# Patient Record
Sex: FEMALE | Race: WHITE | NOT HISPANIC OR LATINO | Employment: UNEMPLOYED | URBAN - METROPOLITAN AREA
[De-identification: names, ages, dates, MRNs, and addresses within clinical notes are randomized per-mention and may not be internally consistent; named-entity substitution may affect disease eponyms.]

---

## 2017-09-13 ENCOUNTER — TRANSFERRED RECORDS (OUTPATIENT)
Dept: HEALTH INFORMATION MANAGEMENT | Facility: CLINIC | Age: 37
End: 2017-09-13

## 2017-10-30 ENCOUNTER — HOSPITAL ENCOUNTER (INPATIENT)
Facility: CLINIC | Age: 37
LOS: 3 days | Discharge: HOME | End: 2017-11-02
Attending: INTERNAL MEDICINE | Admitting: INTERNAL MEDICINE
Payer: COMMERCIAL

## 2017-10-30 PROBLEM — R56.9 SEIZURE (H): Status: ACTIVE | Noted: 2017-10-30

## 2017-10-30 LAB
ALBUMIN SERPL-MCNC: 3.3 G/DL (ref 3.4–5)
ALP SERPL-CCNC: 50 U/L (ref 40–150)
ALT SERPL W P-5'-P-CCNC: 52 U/L (ref 0–50)
ANION GAP SERPL CALCULATED.3IONS-SCNC: 9 MMOL/L (ref 3–14)
AST SERPL W P-5'-P-CCNC: 50 U/L (ref 0–45)
BILIRUB SERPL-MCNC: 0.6 MG/DL (ref 0.2–1.3)
BUN SERPL-MCNC: 8 MG/DL (ref 7–30)
CALCIUM SERPL-MCNC: 8.1 MG/DL (ref 8.5–10.1)
CHLORIDE SERPL-SCNC: 108 MMOL/L (ref 94–109)
CO2 SERPL-SCNC: 24 MMOL/L (ref 20–32)
CREAT SERPL-MCNC: 0.53 MG/DL (ref 0.52–1.04)
ERYTHROCYTE [DISTWIDTH] IN BLOOD BY AUTOMATED COUNT: 12.6 % (ref 10–15)
GFR SERPL CREATININE-BSD FRML MDRD: >90 ML/MIN/1.7M2
GLUCOSE SERPL-MCNC: 105 MG/DL (ref 70–99)
HCG UR QL: NEGATIVE
HCT VFR BLD AUTO: 38 % (ref 35–47)
HGB BLD-MCNC: 13 G/DL (ref 11.7–15.7)
MCH RBC QN AUTO: 29.1 PG (ref 26.5–33)
MCHC RBC AUTO-ENTMCNC: 34.2 G/DL (ref 31.5–36.5)
MCV RBC AUTO: 85 FL (ref 78–100)
PLATELET # BLD AUTO: 188 10E9/L (ref 150–450)
POTASSIUM SERPL-SCNC: 3.7 MMOL/L (ref 3.4–5.3)
PROT SERPL-MCNC: 6.6 G/DL (ref 6.8–8.8)
RBC # BLD AUTO: 4.47 10E12/L (ref 3.8–5.2)
SODIUM SERPL-SCNC: 141 MMOL/L (ref 133–144)
WBC # BLD AUTO: 6.3 10E9/L (ref 4–11)

## 2017-10-30 PROCEDURE — 95951 ZZHC EEG VIDEO EACH 24 HR: CPT

## 2017-10-30 PROCEDURE — 40000061 ZZH STATISTIC EEG TIME EA 10 MIN

## 2017-10-30 PROCEDURE — 36415 COLL VENOUS BLD VENIPUNCTURE: CPT | Performed by: PHYSICIAN ASSISTANT

## 2017-10-30 PROCEDURE — 80053 COMPREHEN METABOLIC PANEL: CPT | Performed by: PHYSICIAN ASSISTANT

## 2017-10-30 PROCEDURE — 85027 COMPLETE CBC AUTOMATED: CPT | Performed by: PHYSICIAN ASSISTANT

## 2017-10-30 PROCEDURE — 25000132 ZZH RX MED GY IP 250 OP 250 PS 637: Performed by: PHYSICIAN ASSISTANT

## 2017-10-30 PROCEDURE — 25000132 ZZH RX MED GY IP 250 OP 250 PS 637: Performed by: PSYCHIATRY & NEUROLOGY

## 2017-10-30 PROCEDURE — 12000000 ZZH R&B MED SURG/OB

## 2017-10-30 PROCEDURE — 81025 URINE PREGNANCY TEST: CPT | Performed by: PHYSICIAN ASSISTANT

## 2017-10-30 PROCEDURE — 99207 ZZC APP CREDIT; MD BILLING SHARED VISIT: CPT | Performed by: PHYSICIAN ASSISTANT

## 2017-10-30 PROCEDURE — 99223 1ST HOSP IP/OBS HIGH 75: CPT | Mod: AI | Performed by: INTERNAL MEDICINE

## 2017-10-30 RX ORDER — RIZATRIPTAN BENZOATE 5 MG/1
5 TABLET, ORALLY DISINTEGRATING ORAL
COMMUNITY
End: 2018-05-18 | Stop reason: DRUGHIGH

## 2017-10-30 RX ORDER — ACETAMINOPHEN 325 MG/1
650 TABLET ORAL EVERY 4 HOURS PRN
Status: DISCONTINUED | OUTPATIENT
Start: 2017-10-30 | End: 2017-11-02 | Stop reason: HOSPADM

## 2017-10-30 RX ORDER — SERTRALINE HYDROCHLORIDE 100 MG/1
100 TABLET, FILM COATED ORAL DAILY
Status: DISCONTINUED | OUTPATIENT
Start: 2017-10-30 | End: 2017-11-02 | Stop reason: HOSPADM

## 2017-10-30 RX ORDER — LORAZEPAM 2 MG/ML
2 INJECTION INTRAMUSCULAR EVERY 5 MIN PRN
Status: DISCONTINUED | OUTPATIENT
Start: 2017-10-30 | End: 2017-11-02 | Stop reason: HOSPADM

## 2017-10-30 RX ORDER — LIDOCAINE 40 MG/G
CREAM TOPICAL
Status: DISCONTINUED | OUTPATIENT
Start: 2017-10-30 | End: 2017-11-02 | Stop reason: HOSPADM

## 2017-10-30 RX ORDER — LEVETIRACETAM 750 MG/1
750 TABLET ORAL 2 TIMES DAILY
Status: DISCONTINUED | OUTPATIENT
Start: 2017-10-30 | End: 2017-10-31

## 2017-10-30 RX ORDER — GINSENG 100 MG
CAPSULE ORAL 3 TIMES DAILY PRN
Status: DISCONTINUED | OUTPATIENT
Start: 2017-10-30 | End: 2017-11-02 | Stop reason: HOSPADM

## 2017-10-30 RX ADMIN — ACETAMINOPHEN 650 MG: 325 TABLET, FILM COATED ORAL at 15:07

## 2017-10-30 RX ADMIN — SERTRALINE HYDROCHLORIDE 100 MG: 100 TABLET ORAL at 11:15

## 2017-10-30 RX ADMIN — LEVETIRACETAM 750 MG: 750 TABLET, FILM COATED ORAL at 11:15

## 2017-10-30 RX ADMIN — LEVETIRACETAM 750 MG: 750 TABLET, FILM COATED ORAL at 20:19

## 2017-10-30 ASSESSMENT — ACTIVITIES OF DAILY LIVING (ADL)
COGNITION: 0 - NO COGNITION ISSUES REPORTED
FALL_HISTORY_WITHIN_LAST_SIX_MONTHS: YES
ADLS_ACUITY_SCORE: 15
NUMBER_OF_TIMES_PATIENT_HAS_FALLEN_WITHIN_LAST_SIX_MONTHS: 2
TOILETING: 0-->INDEPENDENT
ADLS_ACUITY_SCORE: 9
RETIRED_COMMUNICATION: 0-->UNDERSTANDS/COMMUNICATES WITHOUT DIFFICULTY
BATHING: 0-->INDEPENDENT
ADLS_ACUITY_SCORE: 9
RETIRED_EATING: 0-->INDEPENDENT
AMBULATION: 0-->INDEPENDENT
DRESS: 0-->INDEPENDENT
TRANSFERRING: 0-->INDEPENDENT
SWALLOWING: 0-->SWALLOWS FOODS/LIQUIDS WITHOUT DIFFICULTY

## 2017-10-30 NOTE — H&P
"[facility]    History and Physical  Hospitalist       Date of Admission:  10/30/2017  Date of Service (when I saw the patient): 10/30/17    Assessment & Plan   Estelita Patricia is a 37 year old female with PMHx of seizure disorder, Crohn's disease, depression, fatty liver, and WPW who presents for continuous EEG monitoring in the setting of seizure disorder with ongoing, worsening symptoms of daily intermittent, non-focal upper and lower extremity \"twitching\" and \"blank stare.\" Vitals currently WNL. Pt currently stable.     Seizure disorder: Symptoms of upper and lower extremity twitching including her head. Non-specific, non-focal. Occurs every day, numerous times per day. No associated weakness or ataxia. No blurred vision, dysphagia. Does endorse occasional \"blank stare\" when reading or texting with associated lack of coordination at that time. Initially diagnosed with seizure disorder in 2015 at which time she was hospitalized at [facility]; had grand mal seizure. Last seizure 1 year ago. Maintained on Keppra. Also taking medical marijuana.  -- Continuous video EEG monitoring per Dr. Terrell, order set in place as well as formal Neurology consult  -- Hold PTA Keppra    Depression: Continue PTA Zoloft once verified by pharmacy. Pt with plans to establish care with Psych through [facility].     Crohn's disease: Followed by GI through [facility] and transitioning to care under [facility]. No recent flares. Not maintained on any medications.   -- Defer further management to outpatient GI     Asthma: Stable.    Fatty liver disease: Slight elevation of transaminases.     DVT Prophylaxis: Ambulate every shift  Code Status: Full Code    Disposition: Expected discharge per Neurology     Bailey Abdul PA-C    This patient was discussed with Dr. Melgoza of the Hospitalist Service who agrees with current plans as outlined above.     Primary Care Physician   Physician No Ref-Primary    Chief " "Complaint   Continuous EEG monitoring     History is obtained from the patient, CareEverywhere reviewed.     History of Present Illness   Estelita Patricia is a 37 year old female with PMHx of seizure disorder, Crohn's disease, depression, fatty liver, and WPW who presents for continuous EEG monitoring in the setting of seizure disorder with ongoing, worsening symptoms of daily intermittent, non-focal upper and lower extremity \"twitching\" and \"blank stare.\" Vitals currently WNL. Pt currently stable.     Pt notes ongoing symptoms of upper and lower extremity twitching including her head. Non-specific, non-focal. Occurs every day, numerous times per day. No associated weakness or ataxia. No blurred vision, dysphagia. Does endorse occasional \"blank stare\" when reading or texting with associated lack of coordination at that time. Initially diagnosed with seizure disorder in  at which time she was hospitalized at [facility]; had grand mal seizure. Last seizure 1 year ago. Maintained on Keppra. Also taking medical marijuana. Last used 2 days ago. Denies headache. LMP ended 3 days ago.     Past Medical History    1. Seizure disorder; last grand mal >1 year ago   2. Crohn's disease, not currently taking any medications   3. Depression, not currently followed by Psychiatrist or therapist   4. Fatty liver   5. WPW   6. Asthma  7. Morbid obesity     Past Surgical History   1. Ankle surgery X2  2. Liver bx   3. Colonoscopy, numerous   4. Tonsillectomy   5. Cholecystectomy   6,  X4     Prior to Admission Medications   None     Allergies   Allergies not on file    Social History   Denies tobacco or alcohol use. States she uses medical cannabis; last use 2 days ago.     Family History   Mother: Epilepsy    Review of Systems   The 10 point Review of Systems is negative other than noted in the HPI.    Physical Exam   Temp: 98.7  F (37.1  C) Temp src: Oral BP: 110/68 Pulse: 77   Resp: 18 SpO2: 96 % O2 Device: None " (Room air)    Vital Signs with Ranges  Temp:  [98.7  F (37.1  C)] 98.7  F (37.1  C)  Pulse:  [77] 77  Resp:  [18] 18  BP: (110)/(68) 110/68  SpO2:  [96 %] 96 %  325 lbs 9.91 oz    CONSTITUTIONAL: Obese pt laying in bed, dressed in hospital garb. Appears comfortable. Cooperative with interview.   HEENT: Normocephalic, atraumatic. Negative for conjunctival redness or scleral icterus.  Oral mucosa pink and moist; negative for ulcerations, erythema, or exudates.    CARDIOVASCULAR: RRR, no murmurs, rubs, or extra heart sounds appreciated. Pulses +2/4 and regular in upper and lower extremities, bilaterally.   RESPIRATORY: No increased work of breathing.  CTA, bilat; no wheezes, rales, or rhonchi appreciated.  GASTROINTESTINAL:  Abdomen soft, non-distended. BS auscultated in all four quadrants. Negative for tenderness to palpation.  No masses or organomegaly noted.  MUSCULOSKELETAL: No gross deformities noted. Normal muscle tone.   HEMATOLOGIC/LYMPHATIC/IMMUNOLOGIC: Negative for lower extremity edema, bilaterally.  NEUROLOGIC: Alert and oriented to person, place, and time.  strength intact. No focal neuro deficits appreciated.   SKIN: Warm, dry, intact. No jaundice noted. Negative for suspicious lesions, rashes, bruising, open sores or abrasions.     Data   Data reviewed today:  I personally reviewed no images or EKG's today.  No lab results found in last 7 days.    No results found for this or any previous visit (from the past 24 hour(s)).

## 2017-10-30 NOTE — PROGRESS NOTES
Day 1 LTV [id number]  Started --Dr Pamela Herbert ordering.  Photic activation performed , no hv--exercised induced asthma.

## 2017-10-30 NOTE — PLAN OF CARE
Problem: Seizure Disorder/Epilepsy (Adult)  Goal: Signs and Symptoms of Listed Potential Problems Will be Absent, Minimized or Managed (Seizure Disorder/Epilepsy)  Signs and symptoms of listed potential problems will be absent, minimized or managed by discharge/transition of care (reference Seizure Disorder/Epilepsy (Adult) CPG).   Outcome: No Change  Pt direct admission for 24 hr EEG video monitoring. Alert and oriented x4. VSS. Tele SBD. Sz precautions. Pt reports having intermittent leg and arm twitching, unwitnessed by RN. PRN tylenol administered for HA. CMS intact. Bedrest with bathroom privileges. Tolerating regular diet.     Pt continues to report frequent twitching of arms, legs and shoulders. Pressed event monitor button on EEG x1 to report shoulders twitching. No active twitching movements observed by RN.

## 2017-10-30 NOTE — CONSULTS
[facility]    Neurology Consultation     Date of Admission:  10/30/2017  Date of Consult (When I saw the patient): 10/30/17    Assessment & Plan   Estelita Patricia is a 37 year old female who was admitted on 10/30/2017. I was asked to see the patient for myoclonic jerks and staring events- eval for seizures.      #. Seizures- Epilepsy monitoring  --Admit for continuous video/EEG monitoring  --BID hv, photic and exercise  --basic labs: WBC normal  --sleep deprivation: 4 hours of sleep a night  --seizure precautions as per protocol  --home AEDs:  Stop CBD in hospital.  Decrease Keppra to 750 mg bid today.  PTA meds are Keppra 1500 mg bid and CBD.  --Tele monitoring:started  --emergency AED treatment as per usual  --out of bed or chair only with assistance  --continuous pulse ox    #. DVT Prophylaxis  --SCDs  #. Depression  -- continue PTA sertraline  #. Intermittent migraine  --Continue pta prn rizatriptan      I discussed the above diagnosis, assessment, and further testing with the patient.    Indira Terrell MD    Code Status    Full Code        Reason for Consult   Reason for consult: I was asked by Bailey SY to evaluate this patient for frequent seizures.      Chief Complaint   Frequent seizures and myoclonus    History is obtained from the patient and the electronic medical chart.    History of Present Illness   Estelita Patricia is a 37 year old female who presents with a long history of a generalized seizure disorder.  She continues to have myoclonic jerks despite well controlled gtcs.  She is very interested in surgery (VNS) for treatment of her myoclonic jerks.  She has a few per hour in a typical day.  She will drop things, and arms will move randomly at times.  She also has staring spells- up to a few per day- and she has no post-ictal type period with these.  She previously was monitored at [facility]- with JUAN events found.     Her last GTC was 2014 with LOC and  sometimes injuries with the GTCs- 2 total that she knows of.  She also has depression treated by psych.       Current AED: Keppra 150 mg bid and medical CBD.  Previous AED: Depakote caused a tremor    She also notes some numbness in the left arm and left upper outer thigh recently.    Past Medical History   I have reviewed this patient's medical history and updated it with pertinent information if needed.   Epilepsy  Asthma  Lupus  Renal stones      Past Surgical History   I have reviewed this patient's surgical history and updated it with pertinent information if needed.  Ankle repair  C section  Cholecystectomy    Prior to Admission Medications   Prior to Admission Medications   Prescriptions Last Dose Informant Patient Reported? Taking?   LevETIRAcetam (KEPPRA PO) 10/29/2017 at hs  Yes Yes   Sig: Take 1,500 mg by mouth 2 times daily   Sertraline HCl (ZOLOFT PO) 10/29/2017 at am  Yes Yes   Sig: Take 100 mg by mouth   rizatriptan (MAXALT-MLT) 5 MG ODT tab   Yes Yes   Sig: Take 5 mg by mouth at onset of headache for migraine MR x 1      Facility-Administered Medications: None     Allergies   Morphine, sulfa    Social History   I have reviewed this patient's social history and updated it with pertinent information if needed. Estelita Patricia  drinks alcohol some days.  No tobacco.    Family History   I have reviewed this patient's family history and updated it with pertinent information if needed.   Seizures in mother.  Mother was adopted, father's medical history unknown.    Review of Systems   The 10 point Review of Systems is negative other than noted in the HPI.    Physical Exam   Temp: 98.7  F (37.1  C) Temp src: Oral BP: 110/68 Pulse: 77   Resp: 18 SpO2: 96 % O2 Device: None (Room air)    Vital Signs with Ranges  Temp:  [98.7  F (37.1  C)] 98.7  F (37.1  C)  Pulse:  [77] 77  Resp:  [18] 18  BP: (110)/(68) 110/68  SpO2:  [96 %] 96 %  325 lbs 9.91 oz    General Appearance:  No acute distress  Neuro:       Mental  Status Exam:   A,A, fully oriented       Cranial Nerves:  CN 2-12 examined and intact       Motor:  5/5 BUE and BLE       Reflexes:  2+ B Bi, Br, Patellar, Achilles, mute toes, no clonus       Sensory:  Nl LT x4       Coordination:   fnf normal bilaterally       Gait:  Normal with normal pivot on turn  Neck: no nuchal rigidity. No carotid bruits.    Extremities: No clubbing, no cyanosis, no edema  CV: RRR nl s1, s2  Resp: CTAB        Data   Results for orders placed or performed during the hospital encounter of 10/30/17 (from the past 24 hour(s))   CBC with platelets   Result Value Ref Range    WBC 6.3 4.0 - 11.0 10e9/L    RBC Count 4.47 3.8 - 5.2 10e12/L    Hemoglobin 13.0 11.7 - 15.7 g/dL    Hematocrit 38.0 35.0 - 47.0 %    MCV 85 78 - 100 fl    MCH 29.1 26.5 - 33.0 pg    MCHC 34.2 31.5 - 36.5 g/dL    RDW 12.6 10.0 - 15.0 %    Platelet Count 188 150 - 450 10e9/L           Imaging and procedures:  I personally reviewed these images.  None yet

## 2017-10-30 NOTE — IP AVS SNAPSHOT
MRN:[id number]                      After Visit Summary   10/30/2017    Estelita Patricia    MRN: [id number]           Thank you!     Thank you for choosing [facility] for your care. Our goal is always to provide you with excellent care. Hearing back from our patients is one way we can continue to improve our services. Please take a few minutes to complete the written survey that you may receive in the mail after you visit with us. Thank you!        Patient Information     Date Of Birth          1980        Designated Caregiver       Most Recent Value    Caregiver    Will someone help with your care after discharge? yes    Name of designated caregiver Scout    Phone number of caregiver [phone]    Caregiver address unknown      About your hospital stay     You were admitted on:  October 30, 2017 You last received care in the:  [address] [facility]    You were discharged on:  November 2, 2017        Reason for your hospital stay       You have been hospitalized for EEG monitoring                  Who to Call     For medical emergencies, please call 911.  For non-urgent questions about your medical care, please call your primary care provider or clinic, None          Attending Provider     Provider Specialty    Yoshi Ruiz MD Internal Medicine    Howard Melgoza MD Internal Medicine       Primary Care Provider    Physician No Ref-Primary      After Care Instructions     Activity       Your activity upon discharge: activity as tolerated and no driving for today            Diet       Follow this diet upon discharge: Orders Placed This Encounter      Regular Diet Adult                  Follow-up Appointments     Follow-up and recommended labs and tests        Follow up with Dr. Terrell                  Further instructions from your care team       Follow-up with Dr. Anthony Nunez in 1-2 months.  Address: [address], [place] [address]   Phone: [phone]  [phone]         Pending Results     No orders found from 10/28/2017 to 10/31/2017.            Statement of Approval     Ordered          17 1356  I have reviewed and agree with all the recommendations and orders detailed in this document.  EFFECTIVE NOW     Approved and electronically signed by:  Sam Brush MD             Admission Information     Date & Time Provider Department Dept. Phone    10/30/2017 Howard Melgoza MD [address] [facility] [phone]      Your Vitals Were     Blood Pressure Pulse Temperature Respirations Weight Pulse Oximetry    107/63 78 98  F (36.7  C) (Oral) 16 147.7 kg (325 lb 9.9 oz) 95%      MyChart Information     [employer] lets you send messages to your doctor, view your test results, renew your prescriptions, schedule appointments and more. To sign up, go to [email or web address] . Click on \"Log in\" on the left side of the screen, which will take you to the Welcome page. Then click on \"Sign up Now\" on the right side of the page.     You will be asked to enter the access code listed below, as well as some personal information. Please follow the directions to create your username and password.     Your access code is: [email or web address]  Expires: 2018  1:58 PM     Your access code will  in 90 days. If you need help or a new code, please call your [facility] clinic or [phone].        Care EveryWhere ID     This is your Care EveryWhere ID. This could be used by other organizations to access your [facility] medical records  [id number]        Equal Access to Services     [facility]: Hadii aad mathew hadasho Somiguelali, waaxda luqadaha, qaybta kaalmada glen, mike baker . So [facility] [phone].    ATENCIÓN: Si habla español, tiene a mckeon disposición servicios gratuitos de asistencia lingüística. Llame al [phone].    We comply with applicable federal civil rights laws and [place] laws. We do not discriminate on the " basis of race, color, national origin, age, disability, sex, sexual orientation, or gender identity.               Review of your medicines      CONTINUE these medicines which have NOT CHANGED        Dose / Directions    ACETAMINOPHEN PO        Dose:  1000 mg   Take 1,000 mg by mouth At Bedtime   Refills:  0       AMBIEN PO        Dose:  10 mg   Take 10 mg by mouth nightly as needed for sleep   Refills:  0       KEPPRA PO        Dose:  1500 mg   Take 1,500 mg by mouth 2 times daily   Refills:  0       rizatriptan 5 MG ODT tab   Commonly known as:  MAXALT-MLT        Dose:  5 mg   Take 5 mg by mouth at onset of headache for migraine MR x 1   Refills:  0       ZOLOFT PO        Dose:  100 mg   Take 100 mg by mouth   Refills:  0         STOP taking     NONFORMULARY                    Protect others around you: Learn how to safely use, store and throw away your medicines at www.disposemymeds.org.             Medication List: This is a list of all your medications and when to take them. Check marks below indicate your daily home schedule. Keep this list as a reference.      Medications           Morning Afternoon Evening Bedtime As Needed    ACETAMINOPHEN PO   Take 1,000 mg by mouth At Bedtime   Last time this was given:  650 mg on 11/2/2017 10:48 AM                                   AMBIEN PO   Take 10 mg by mouth nightly as needed for sleep                                   KEPPRA PO   Take 1,500 mg by mouth 2 times daily   Last time this was given:  2,000 mg on 11/2/2017  9:05 AM   Next Dose Due:  Take 1,500mg by mouth at 9:00pm             9:00AM                 9:00PM           rizatriptan 5 MG ODT tab   Commonly known as:  MAXALT-MLT   Take 5 mg by mouth at onset of headache for migraine MR x 1                                   ZOLOFT PO   Take 100 mg by mouth   Last time this was given:  100 mg on 11/2/2017  9:06 AM            9:00 AM

## 2017-10-30 NOTE — PHARMACY-ADMISSION MEDICATION HISTORY
"Admission medication history interview status for the 10/30/2017  admission is complete. See EPIC admission navigator for prior to admission medications     Medication history source reliability:Good    Actions taken by pharmacist (provider contacted, etc):None     Additional medication history information not noted on PTA med list : Pt smokes canabis and considers it \"Medicinal\" for seizures    Medication reconciliation/reorder completed by provider prior to medication history? No    Time spent in this activity: 25 min    Prior to Admission medications    Medication Sig Last Dose Taking? Auth Provider   Zolpidem Tartrate (AMBIEN PO) Take 10 mg by mouth nightly as needed for sleep 10/29/2017 at hs Yes Unknown, Entered By History   ACETAMINOPHEN PO Take 1,000 mg by mouth At Bedtime 10/29/2017 at hs Yes Unknown, Entered By History   NONFORMULARY Canabis 2 puffs q2h prn seizure Past Week at Unknown time Yes Unknown, Entered By History   LevETIRAcetam (KEPPRA PO) Take 1,500 mg by mouth 2 times daily 10/29/2017 at hs Yes Unknown, Entered By History   Sertraline HCl (ZOLOFT PO) Take 100 mg by mouth 10/29/2017 at am Yes Unknown, Entered By History   rizatriptan (MAXALT-MLT) 5 MG ODT tab Take 5 mg by mouth at onset of headache for migraine MR x 1 prn Yes Unknown, Entered By History         "

## 2017-10-30 NOTE — IP AVS SNAPSHOT
[address] [facility]    [address]    [place] [address]    Phone:  [phone]                                       After Visit Summary   10/30/2017    Estelita Patricia    MRN: [id number]           After Visit Summary Signature Page     I have received my discharge instructions, and my questions have been answered. I have discussed any challenges I see with this plan with the nurse or doctor.    ..........................................................................................................................................  Patient/Patient Representative Signature      ..........................................................................................................................................  Patient Representative Print Name and Relationship to Patient    ..................................................               ................................................  Date                                            Time    ..........................................................................................................................................  Reviewed by Signature/Title    ...................................................              ..............................................  Date                                                            Time

## 2017-10-31 PROCEDURE — 25000132 ZZH RX MED GY IP 250 OP 250 PS 637: Performed by: PSYCHIATRY & NEUROLOGY

## 2017-10-31 PROCEDURE — 99232 SBSQ HOSP IP/OBS MODERATE 35: CPT | Performed by: INTERNAL MEDICINE

## 2017-10-31 PROCEDURE — 12000007 ZZH R&B INTERMEDIATE

## 2017-10-31 PROCEDURE — 95951 ZZHC EEG VIDEO EACH 24 HR: CPT

## 2017-10-31 PROCEDURE — 25000132 ZZH RX MED GY IP 250 OP 250 PS 637: Performed by: PHYSICIAN ASSISTANT

## 2017-10-31 RX ADMIN — LEVETIRACETAM 750 MG: 750 TABLET, FILM COATED ORAL at 09:06

## 2017-10-31 RX ADMIN — SERTRALINE HYDROCHLORIDE 100 MG: 100 TABLET ORAL at 09:06

## 2017-10-31 RX ADMIN — ACETAMINOPHEN 650 MG: 325 TABLET, FILM COATED ORAL at 04:48

## 2017-10-31 RX ADMIN — ACETAMINOPHEN 650 MG: 325 TABLET, FILM COATED ORAL at 00:03

## 2017-10-31 ASSESSMENT — ACTIVITIES OF DAILY LIVING (ADL)
ADLS_ACUITY_SCORE: 9

## 2017-10-31 NOTE — PROGRESS NOTES
[facility]    Neurology Progress Note    Date of Service (when I saw the patient): 10/31/2017     Today's developments:  24 hours of video EEG done.  No abnormal discharges on EEG.  About 5 jerking movements typical of what she experiences at home- no EEG change during these.  Will stop her Keppra, continue monitoring.    Assessment & Plan   Estelita Patricia is a 37 year old female who was admitted on 10/30/2017. I was asked to see the patient for myoclonic jerks and staring events- eval for seizures.        #. Seizures- Epilepsy monitoring  --Continuous video/EEG monitoring  --BID hv, photic and exercise  --basic labs: AST/ALT very mildly elevated, mild low albumin and protein, hcg negative, WBC normal  --sleep deprivation: 4 hours of sleep tonight.  Then starting 8 am on 11-1-17 she can sleep whenever she wants.  --seizure precautions as per protocol  --home AEDs:  Stop CBD in hospital.  Stop Keppra today.  PTA meds are Keppra 1500 mg bid and CBD.  --Tele monitoring: NSR and mild Sinus gabriella  --emergency AED treatment as per usual  --out of bed or chair only with assistance  --continuous pulse ox     #. DVT Prophylaxis  --SCDs  #. Depression  -- continue PTA sertraline  #. Intermittent migraine  --Continue pta prn rizatriptan     I discussed the above diagnosis, assessment, testing, and results with the patient.    Indira Terrell MD          Interval History   Estelita had over 5 of her typical jerking movements.  This included her arms and shoulders.  No staring spells or other types of seizures that she knows of.  She feels very tired today from the sleep deprivation.  No other complaints.    Physical Exam   Temp: 97.8  F (36.6  C) Temp src: Axillary BP: 118/61 Pulse: 67   Resp: 16 SpO2: 96 % O2 Device: None (Room air)    Vitals:    10/30/17 0851   Weight: (!) 147.7 kg (325 lb 9.9 oz)     Vital Signs with Ranges  Temp:  [97.7  F (36.5  C)-98.5  F (36.9  C)] 97.8  F (36.6  C)  Pulse:   [64-84] 67  Resp:  [16-18] 16  BP: (103-118)/(54-68) 118/61  SpO2:  [96 %-99 %] 96 %         General Appearance:  No acute distress  Neuro:       Mental Status Exam: A,A fully oriented       Cranial Nerves:   EOMI, face equal and symmetric       Motor:   5/5 BUE and BLE       Sensory:  Nl LT x4                   Coordination: fnf normal bilaterally  CV: RRR nl s1, s2  Resp: CTAB    Extremities: No clubbing, no cyanosis, no edema    Medications        sodium chloride (PF)  3 mL Intracatheter Q8H     sertraline (ZOLOFT) tablet 100 mg  100 mg Oral Daily     levETIRAcetam  750 mg Oral BID       Data   Results for orders placed or performed during the hospital encounter of 10/30/17 (from the past 24 hour(s))   HCG qualitative urine   Result Value Ref Range    HCG Qual Urine Negative NEG^Negative         Images and Procedures:  I personally reviewed the images of the following studies:  EEG: normal- no epileptiform discharges, no changes during twitches/vey mild intermittent myoclonus on video

## 2017-10-31 NOTE — PLAN OF CARE
Problem: Seizure Disorder/Epilepsy (Adult)  Goal: Signs and Symptoms of Listed Potential Problems Will be Absent, Minimized or Managed (Seizure Disorder/Epilepsy)  Signs and symptoms of listed potential problems will be absent, minimized or managed by discharge/transition of care (reference Seizure Disorder/Epilepsy (Adult) CPG).   Outcome: Improving  24 hr cont EEG, szr precauions maintained, pt reported several episodes of twitching to whole body and L arm.  A&O x4. Neuros intact, some baseline blurry vision to L eye and int numbness to L thigh. VSS on RA. Tele SD. Reg diet. BR w/ BRP, up w/ 1 and GB to bathroom. Mild HA pain, decreased w/ tylenol. Plan cont EEG, d/c pending .

## 2017-10-31 NOTE — PROGRESS NOTES
[facility]    Hospitalist Progress Note    Date of Service (when I saw the patient): 10/31/2017    Assessment & Plan   Estelita Patricia is a 37 year old female who was admitted on 10/30/2017.   Patient is being followed by neurology    Seizure disorder  - Continuous EEG a  Status post sleep deprivation  Patient is being video monitored also  she is on Keppra     Systemic lupus erythematosus   - liver enzymes are slightly elevated which could be from her medications or obesity     Reports has involved her ankles (arthritis) and liver. No kidney involvement  - Not on any medications     Crohn's disease  - Stable. Not on any medications.    And is in remission       Code Status: Full Code        Bhavjobijal German    Interval History  patient states that she feels fine and has no questions for meData reviewed today: all the labs.    Physical Exam   Temp: 97.8  F (36.6  C) Temp src: Axillary BP: 118/61 Pulse: 67   Resp: 16 SpO2: 96 % O2 Device: None (Room air)    Vitals:    10/30/17 0851   Weight: (!) 147.7 kg (325 lb 9.9 oz)     Vital Signs with Ranges  Temp:  [97.7  F (36.5  C)-98.5  F (36.9  C)] 97.8  F (36.6  C)  Pulse:  [64-84] 67  Resp:  [16-18] 16  BP: (103-118)/(54-63) 118/61  SpO2:  [96 %-99 %] 96 %       Constitutional:  alert and oriented  Respiratory: no crackles   Normal S1 and S2 on cardiac examSkin/Integumen: no rash   Other:   no neurological deficit    Medications        sodium chloride (PF)  3 mL Intracatheter Q8H     sertraline (ZOLOFT) tablet 100 mg  100 mg Oral Daily     levETIRAcetam  750 mg Oral BID       Data     Recent Labs  Lab 10/30/17  0920   WBC 6.3   HGB 13.0   MCV 85         POTASSIUM 3.7   CHLORIDE 108   CO2 24   BUN 8   CR 0.53   ANIONGAP 9   BERT 8.1*   *   ALBUMIN 3.3*   PROTTOTAL 6.6*   BILITOTAL 0.6   ALKPHOS 50   ALT 52*   AST 50*       No results found for this or any previous visit (from the past 24 hour(s)).

## 2017-11-01 PROCEDURE — 95951 ZZHC EEG VIDEO EACH 24 HR: CPT

## 2017-11-01 PROCEDURE — 12000007 ZZH R&B INTERMEDIATE

## 2017-11-01 PROCEDURE — 25000132 ZZH RX MED GY IP 250 OP 250 PS 637: Performed by: PSYCHIATRY & NEUROLOGY

## 2017-11-01 PROCEDURE — 40000061 ZZH STATISTIC EEG TIME EA 10 MIN

## 2017-11-01 PROCEDURE — 25000132 ZZH RX MED GY IP 250 OP 250 PS 637: Performed by: PHYSICIAN ASSISTANT

## 2017-11-01 PROCEDURE — 99232 SBSQ HOSP IP/OBS MODERATE 35: CPT | Performed by: INTERNAL MEDICINE

## 2017-11-01 RX ADMIN — LEVETIRACETAM 2000 MG: 750 TABLET, FILM COATED ORAL at 09:19

## 2017-11-01 RX ADMIN — SERTRALINE HYDROCHLORIDE 100 MG: 100 TABLET ORAL at 09:20

## 2017-11-01 RX ADMIN — LEVETIRACETAM 2000 MG: 750 TABLET, FILM COATED ORAL at 21:15

## 2017-11-01 ASSESSMENT — VISUAL ACUITY
OU: NORMAL ACUITY

## 2017-11-01 ASSESSMENT — ACTIVITIES OF DAILY LIVING (ADL)
ADLS_ACUITY_SCORE: 9

## 2017-11-01 NOTE — PROGRESS NOTES
[facility]    Hospitalist Progress Note    Date of Service (when I saw the patient): 11/01/2017    Assessment & Plan   Estelita Patricia is a 37 year old female who was admitted on 10/30/2017.   Patient is being followed by neurology     Seizure disorder  On continuous video/EEG monitoring.    Neurology restarting keppra at higher dose tahn PTA today, with plans to resume PTA dose in AM.       Systemic lupus erythematosus   - liver enzymes are slightly elevated which could be from her medications or obesity      Reports has involved her ankles (arthritis) and liver. No kidney involvement  - Not on any medications      Crohn's disease  - Stable. Not on any medications.    States she is establishing care with a new Gastroenterologist soon.      DVT Prophylaxis: Pneumatic Compression Devices  Code Status: Full Code    Disposition: Expected discharge 1 day.     Sam Brush MD  [phone] (P)  Text Page (7 am to 6 pm)    Interval History   Feels ok. Remains fatigued. Says her bowels are at baseline. Has been having some diaphoresis.     -Data reviewed today: I reviewed all new labs and imaging results over the last 24 hours. I personally reviewed no images or EKG's today.    Physical Exam   Temp: 98.3  F (36.8  C) Temp src: Oral BP: 111/62 Pulse: 66   Resp: 16 SpO2: 95 % O2 Device: None (Room air)    Vitals:    10/30/17 0851   Weight: (!) 147.7 kg (325 lb 9.9 oz)     Vital Signs with Ranges  Temp:  [97.6  F (36.4  C)-98.8  F (37.1  C)] 98.3  F (36.8  C)  Pulse:  [58-81] 66  Resp:  [16] 16  BP: (111-125)/(59-70) 111/62  SpO2:  [94 %-98 %] 95 %  I/O last 3 completed shifts:  In: 480 [P.O.:480]  Out: -     Constitutional: No acute distress  Respiratory: Clear to auscultation bilaterally, no crackles  Cardiovascular: Regular rate and rhythm, S1, S2, no murmurs  GI: Abdomen soft, obese, nontender, nondistended, bowel sounds are heard  Skin/Integumen: No jaundice, no suspicious rash      Medications         [START ON 11/2/2017] influenza quadrivalent (PF) vacc age 3 yrs and older  0.5 mL Intramuscular Prior to discharge     levETIRAcetam  2,000 mg Oral BID     sodium chloride (PF)  3 mL Intracatheter Q8H     sertraline (ZOLOFT) tablet 100 mg  100 mg Oral Daily       Data     Recent Labs  Lab 10/30/17  0920   WBC 6.3   HGB 13.0   MCV 85         POTASSIUM 3.7   CHLORIDE 108   CO2 24   BUN 8   CR 0.53   ANIONGAP 9   BERT 8.1*   *   ALBUMIN 3.3*   PROTTOTAL 6.6*   BILITOTAL 0.6   ALKPHOS 50   ALT 52*   AST 50*       No results found for this or any previous visit (from the past 24 hour(s)).

## 2017-11-01 NOTE — PLAN OF CARE
Problem: Patient Care Overview  Goal: Plan of Care/Patient Progress Review  Outcome: Improving  A&Ox4. Blurred vision in L eye. Denies numbness or tingling. Continuous EEG monitoring with sleep deprivation- only 4 hours of sleep tonight. Pt reported 3 episodes of twitching in L leg lasting 1-10 sec. Seizure precautions in place. VSS. Tele SD. Reg diet. Bedrest with bathroom privileges. C/o frequent headache, not wanting any medication though. Discharge plan pending. Continue with POC.

## 2017-11-01 NOTE — PROGRESS NOTES
[facility]    Neurology Progress Note    Date of Service (when I saw the patient): 11/01/2017     Today's developments:Continuous video eeg- normal awake and asleep.  One probable gen spike and wave early am today- no clinical correlate.  Multiple episodes of jerking movements and one episode staring without EEG correlate.  Will restart Keppra (higher dose today than PTA today only), plan for continued monitoring and dc on 11-2-17 pm.    Assessment & Plan   Estelita Patricia is a 37 year old female who was admitted on 10/30/2017. I was asked to see the patient for myoclonic jerks and staring events- eval for seizures.  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          #. Seizures- Epilepsy monitoring  --Continuous video/EEG monitoring- as above  --BID hv, photic and exercise  --basic labs: AST/ALT very mildly elevated, mild low albumin and protein, hcg negative, WBC normal  --starting 8 am on 11-1-17 she can sleep whenever she wants.  --seizure precautions as per protocol  --home AEDs:  Restarted Keppra on 11-1-17- higher dose than PTA, will change to PTA dose tomorrow am with anticipating her going home 11-2-17 on 1500 mg bid Keppra.  Stop CBD in hospital.  Stopped Keppra on 10-31-17 PTA meds are Keppra 1500 mg bid and CBD.  --Tele monitoring: NSR and mild Sinus gabriella  --emergency AED treatment as per usual  --out of bed or chair only with assistance  --continuous pulse ox      #. DVT Prophylaxis  --SCDs  #. Depression  -- continue PTA sertraline  #. Intermittent migraine  --Continue pta prn rizatriptan    I discussed the above diagnosis, assessment, testing, and results with the patient.    Indira Terrell MD          Interval History   Estelita continues to have intermittent muscle jerking movements.  These tend to be small movements, very quick, can be arms, legs, abdomen.  I witnessed one today- very small B shoulder jerk without alteration in  consciousness.  She also noted a staring spell sometime between 8 pm and 10 pm yesterday.  She has blurry vision during these, with lights having a fuzzy halo around them.    Physical Exam   Temp: 97.9  F (36.6  C) Temp src: Oral BP: 111/59 Pulse: 60   Resp: 16 SpO2: 97 % O2 Device: None (Room air)    Vitals:    10/30/17 0851   Weight: (!) 147.7 kg (325 lb 9.9 oz)     Vital Signs with Ranges  Temp:  [97.8  F (36.6  C)-98.8  F (37.1  C)] 97.9  F (36.6  C)  Pulse:  [58-72] 60  Resp:  [16] 16  BP: (111-125)/(59-70) 111/59  SpO2:  [95 %-98 %] 97 %  I/O last 3 completed shifts:  In: 480 [P.O.:480]  Out: -       General Appearance:  No acute distress  Neuro:       Mental Status Exam:   A,A, fully oriented       Cranial Nerves:   EOMI, face equal and symmetric       Motor:  5/5 BUE and BLE       Sensory:  Nl LT x4                  Coordination:  fnf normal bilaterally  CV: RRR nl s1, s2  Resp: CTAB    Extremities: No clubbing, no cyanosis, no edema    Medications        [START ON 11/2/2017] influenza quadrivalent (PF) vacc age 3 yrs and older  0.5 mL Intramuscular Prior to discharge     sodium chloride (PF)  3 mL Intracatheter Q8H     sertraline (ZOLOFT) tablet 100 mg  100 mg Oral Daily       Data   No results found for this or any previous visit (from the past 24 hour(s)).      Images and Procedures:  I personally reviewed the images of the following studies:  Continuous video eeg- normal awake and asleep.  One probable gen spike and wave early am today- no clinical correlate.  Multiple episodes of jerking movements and one episode staring without EEG correlate.

## 2017-11-01 NOTE — PLAN OF CARE
Problem: Patient Care Overview  Goal: Plan of Care/Patient Progress Review  Outcome: Improving  Pt alert and oriented x4. VSS. Tele SR with PACs. On szr precautions, no witnessed szr activity. Pt reports twitching of extremities x2. Pushed alert button on 24 EEG monitor x1. Reports blurriness in L eye, neuros o/w intact. CMS intact. Denies pain. Tolerating regular diet. Plan to d/c home tomorrow.     One additional episode of R leg twitching reported ~1745.

## 2017-11-01 NOTE — PLAN OF CARE
Problem: Seizure Disorder/Epilepsy (Adult)  Goal: Signs and Symptoms of Listed Potential Problems Will be Absent, Minimized or Managed (Seizure Disorder/Epilepsy)  Signs and symptoms of listed potential problems will be absent, minimized or managed by discharge/transition of care (reference Seizure Disorder/Epilepsy (Adult) CPG).   Outcome: Improving  A&Ox 4. Left eye blurry vision. VSS. Tele SD. Regular diet. Up with 1 with belt to use the bathroom. Seizure precaution. Reported twitching of right shoulder and starring episode x1 but does not remember how long it lasted. Denies pain. Plan continue EEG monitor.

## 2017-11-01 NOTE — PLAN OF CARE
Problem: Seizure Disorder/Epilepsy (Adult)  Goal: Signs and Symptoms of Listed Potential Problems Will be Absent, Minimized or Managed (Seizure Disorder/Epilepsy)  Signs and symptoms of listed potential problems will be absent, minimized or managed by discharge/transition of care (reference Seizure Disorder/Epilepsy (Adult) CPG).   Outcome: No Change  A&Ox4. Neuros intact except baseline L blurred vision. Multiple reported episodes of RLE twitching that would last for 10-15 secs per pt. Did have an episode in the afternoon that the pt reported the twitching in RLE & abdomen & felt sweaty. VSS. Tele SD. Regular diet. Up with bedrest with bathroom priveledges. C/o headache, declined intervention. Plan per Dr. Terrell to stop Keppra & continue EEG protocol.

## 2017-11-02 VITALS
RESPIRATION RATE: 16 BRPM | HEART RATE: 78 BPM | TEMPERATURE: 98 F | SYSTOLIC BLOOD PRESSURE: 107 MMHG | DIASTOLIC BLOOD PRESSURE: 63 MMHG | OXYGEN SATURATION: 95 % | WEIGHT: 293 LBS

## 2017-11-02 PROCEDURE — 99239 HOSP IP/OBS DSCHRG MGMT >30: CPT | Performed by: INTERNAL MEDICINE

## 2017-11-02 PROCEDURE — 95951 ZZHC EEG VIDEO EACH 24 HR: CPT

## 2017-11-02 PROCEDURE — 90686 IIV4 VACC NO PRSV 0.5 ML IM: CPT | Performed by: INTERNAL MEDICINE

## 2017-11-02 PROCEDURE — 40000061 ZZH STATISTIC EEG TIME EA 10 MIN

## 2017-11-02 PROCEDURE — 25000132 ZZH RX MED GY IP 250 OP 250 PS 637: Performed by: PHYSICIAN ASSISTANT

## 2017-11-02 PROCEDURE — 25000132 ZZH RX MED GY IP 250 OP 250 PS 637: Performed by: PSYCHIATRY & NEUROLOGY

## 2017-11-02 PROCEDURE — 25000128 H RX IP 250 OP 636: Performed by: INTERNAL MEDICINE

## 2017-11-02 RX ORDER — LEVETIRACETAM 750 MG/1
1500 TABLET ORAL 2 TIMES DAILY
Status: DISCONTINUED | OUTPATIENT
Start: 2017-11-02 | End: 2017-11-02 | Stop reason: HOSPADM

## 2017-11-02 RX ADMIN — SERTRALINE HYDROCHLORIDE 100 MG: 100 TABLET ORAL at 09:06

## 2017-11-02 RX ADMIN — INFLUENZA A VIRUS A/MICHIGAN/45/2015 X-275 (H1N1) ANTIGEN (FORMALDEHYDE INACTIVATED), INFLUENZA A VIRUS A/HONG KONG/4801/2014 X-263B (H3N2) ANTIGEN (FORMALDEHYDE INACTIVATED), INFLUENZA B VIRUS B/PHUKET/3073/2013 ANTIGEN (FORMALDEHYDE INACTIVATED), AND INFLUENZA B VIRUS B/BRISBANE/60/2008 ANTIGEN (FORMALDEHYDE INACTIVATED) 0.5 ML: 15; 15; 15; 15 INJECTION, SUSPENSION INTRAMUSCULAR at 09:46

## 2017-11-02 RX ADMIN — ACETAMINOPHEN 650 MG: 325 TABLET, FILM COATED ORAL at 10:48

## 2017-11-02 RX ADMIN — LEVETIRACETAM 2000 MG: 750 TABLET, FILM COATED ORAL at 09:05

## 2017-11-02 ASSESSMENT — ACTIVITIES OF DAILY LIVING (ADL)
ADLS_ACUITY_SCORE: 9

## 2017-11-02 ASSESSMENT — VISUAL ACUITY
OU: NORMAL ACUITY
OU: NORMAL ACUITY

## 2017-11-02 NOTE — DISCHARGE INSTRUCTIONS
Follow-up with Dr. Anthony Nunez in 1-2 months.  Address: [address] [place] [address]   Phone: [phone]

## 2017-11-02 NOTE — DISCHARGE SUMMARY
INTERNAL MEDICINE DISCHARGE SUMMARY      ADMISSION DATE:  10/30/2017   DISCHARGE DATE:  2017      DISCHARGE DIAGNOSES:   1.  Seizure disorder.   2.  Crohn disease.   3.  Reported systemic lupus erythematosus.   4.  Morbid obesity.      DISCHARGE MEDICATIONS:   1.  Acetaminophen 1000 mg p.o. at bedtime.   2.  Ambien 10 mg p.o. every night for sleep.   3.  Keppra 1500 mg p.o. twice daily.   4.  Maxalt 5 mg p.o. at onset of migraine headache.   5.  Zoloft 100 mg p.o. daily.      HOSPITAL COURSE:  Davian Patricia is a 37-year-old woman who was admitted on 10/30/2017 due to concern for possible breakthrough seizures superimposed on her seizure disorder.  She was admitted for video EEG monitoring and followed by Dr. Indira Terrell.  She was sleep deprived and taken off her antiepileptic medications while here and had video EEG monitoring, the results of which were that she was not found to have any clear breakthrough events.  She therefore resumed her Keppra on the day prior to discharge, and she has continued to do well.  She will be discharging home on Keppra 1500 mg p.o. twice daily.      PHYSICAL EXAMINATION:   GENERAL:  On the day of discharge, the patient is feeling well.   RESPIRATORY:  Clear bilaterally.   CARDIAC:  Regular rate and rhythm, S1, S2 are heard, no murmurs.   ABDOMEN:  Soft, nontender, nondistended.  Bowel sounds are heard.   NEUROLOGIC:  She appears to be at her baseline.      She will follow up with Dr. Claire Terrell as previously scheduled as well as she will be following up with her gastroenterologist regarding her Crohn disease.      All her questions were answered to her satisfaction on the day of discharge.      Physician time today 35 minutes.         ELENA SILVERMAN MD             D: 2017 14:00   T: 2017 14:20   MT:       Name:     DAVIAN PATRICIA   MRN:      [id number]        Account:        [id number]   :      1980           Admit  Date:     [id number]                                  Discharge Date:       Document: [id number]

## 2017-11-02 NOTE — PLAN OF CARE
Problem: Patient Care Overview  Goal: Plan of Care/Patient Progress Review  Pt A&O x4. VSS. Neuros intact. CMS intact. Strength equal bilaterally. 24hr EEG. Tele - normal sinus rhythm. LS clear bilaterally.Transfers with SBA,, regular diet. Pt had HA this afternoon rating 7/10- given PRN Tylenol, since resolved. Baseline blurry vision in L eye. Sz precautions maintained, no seizure activity observed - pt reported twitching in R thigh intermittently. D/c home with father this afternoon. Discharge teaching done and all questions answered.

## 2017-11-02 NOTE — PLAN OF CARE
Problem: Seizure Disorder/Epilepsy (Adult)  Goal: Signs and Symptoms of Listed Potential Problems Will be Absent, Minimized or Managed (Seizure Disorder/Epilepsy)  Signs and symptoms of listed potential problems will be absent, minimized or managed by discharge/transition of care (reference Seizure Disorder/Epilepsy (Adult) CPG).   Outcome: Improving  Pt on 24hr cont EEG, seizure prec maintained, not seizure activity noted but pt did report one episode of R leg jerking when up to bthrm.  A&O x4. Neuros intact, blurry vision to L eye baseline. VSS. Tele SR. Reg diet. BR w/ BRP, up SBA when up to bthrm. Denies pain. Plan for d/c home today pending.

## 2017-11-02 NOTE — PROGRESS NOTES
[facility]    Neurology Progress Note    Date of Service (when I saw the patient): 11/02/2017     Today's developments: Continuous video eeg: no epileptiform discharges.  A few more jerking movements- no EEG correlate.  Ok to d/c home today and resume PTA dose of Keppra 1500 mg BID.  F/u w Dr. Anthony Nunez in 1-2 months.    Assessment & Plan   Estelita Patricia is a 37 year old female who was admitted on 10/30/2017. I was asked to see the patient for myoclonic jerks and staring events- eval for seizures.  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          #. Seizures- Epilepsy monitoring  --Continuous video/EEG monitoring- as above  --BID hv, photic and exercise  --basic labs: AST/ALT very mildly elevated, mild low albumin and protein, hcg negative, WBC normal  --starting 8 am on 11-1-17 she can sleep whenever she wants.  --seizure precautions as per protocol  --home AEDs:  Resume PTA Keppra 1500 mg bid on discharge 11-2-17.  Restarted Keppra on 11-1-17- higher dose than PTA, will change to PTA dose tomorrow am with anticipating her going home 11-2-17 on 1500 mg bid Keppra.  Stop CBD in hospital.  Stopped Keppra on 10-31-17 PTA meds are Keppra 1500 mg bid and CBD.  --Tele monitoring: NSR with rare pvcs  --emergency AED treatment as per usual  --out of bed or chair only with assistance  --continuous pulse ox  I discussed the above diagnosis, assessment, testing, and results with the patient.      Indira Terrell MD          Interval History   A few more jerking episodes.  No staring spells or other spells.  She asked about VNS for depression.  I believe it is not FDA approved for depression- I recommended that she talk to her psychiatrist regarding this.  Her dad will pick her up today for discharge.  She had no episodes of LOC or significant alteration of consciousness, and no seizures, so driving would be based on last known seizure- no driving for 3  months after when that last seizure was.    Physical Exam   Temp: 98  F (36.7  C) Temp src: Oral BP: 107/63 Pulse: 78   Resp: 16 SpO2: 95 % O2 Device: None (Room air)    Vitals:    10/30/17 0851   Weight: (!) 147.7 kg (325 lb 9.9 oz)     Vital Signs with Ranges  Temp:  [97.8  F (36.6  C)-98.6  F (37  C)] 98  F (36.7  C)  Pulse:  [66-96] 78  Resp:  [14-16] 16  BP: (101-132)/(59-66) 107/63  SpO2:  [95 %-97 %] 95 %  I/O last 3 completed shifts:  In: 880 [P.O.:880]  Out: -       General Appearance:  No acute distress  Neuro:       Mental Status Exam:   A,A, fully oriented, no aphasia       Cranial Nerves: EOMI, face equal and symmetric, speech normal       Motor:  5/5 BUE and BLE       Sensory: nl LT x4       Coordination:  fnf normal bilaterally  CV: RRR nl s1, s2  Resp: CTAB    Extremities: No clubbing, no cyanosis, no edema    Medications        levETIRAcetam  2,000 mg Oral BID     sodium chloride (PF)  3 mL Intracatheter Q8H     sertraline (ZOLOFT) tablet 100 mg  100 mg Oral Daily       Data   No results found for this or any previous visit (from the past 24 hour(s)).      Images and Procedures:  I personally reviewed the images of the following studies:  Continuous video eeg: no epileptiform discharges.  A few more jerking movements- no EEG correlate.

## 2017-11-05 NOTE — PROCEDURES
LONG TERM ELECTROENCEPHALOGRAM WITH VIDEO      ORDERING PHYSICIAN:  Dr. August      EEG # [id number], day 1, from 10/30/2017 to 10/31/2017.      This is a continuous video-EEG performed on Davian Patricia in the standard [employer] 10-20 electrode system.  The background activity during wakefulness is mainly in the alpha frequency and is symmetric.  There is posterior dominant rhythm of 9 Hz bilaterally with quiet wakefulness and eye closure.  Hyperventilation and photic stimulation and exercise were done with no change during those procedures.  No electrographic seizures or epileptiform discharges occurred during this recording.  The one-lead EKG was unremarkable.  Sleep was recorded with normal POSTS, vertex waves and K complexes.  The patient had a number of events, as described below, with no EEG changes.      The patient had multiple jerking events during this recording.  Examples include a shoulder jerking movement on 10/30/2017 at 16:49:28, right arm movements on 10/30/2017 at 19:09:41, left shoulder twitching on 10/31/2017 at 11:10:10.  All events were reviewed on video and all events were very low amplitude mild amounts, not consistent with a myoclonic jerk.  EEG was reviewed during those events as well and there was no change in background, normal EEG during these events.      IMPRESSION:  This is a normal continuous video-EEG during awake, drowsiness and sleep.  The patient had a number of what she described as jerking movements including the right thigh, shoulders, left shoulder, all of which were very small amplitude and had no EEG correlate.  These findings are not consistent seizure discharges.  The recording was continued for another day.         LALO AUGUST MD             D: 2017 14:20   T: 2017 16:37   MT: CD      Name:     DAVIAN PATRICIA   MRN:      [id number]        Account:        [id number]   :      1980           Procedure Date: 10/30/2017       Document: [id number]

## 2017-11-05 NOTE — PROCEDURES
LONG TERM ELECTROENCEPHALOGRAM WITH VIDEO      ORDERING PHYSICIAN:  Dr. Terrell      EEG # [id number], day 2, from 10/31/2017 to 11/01/2017.      This is a continuous video-EEG performed on Estelita Patricia in the standard [employer] 10-20 electrode system.  The awake activity is mostly in the alpha frequency and is symmetric.  There is posterior dominant rhythm up to about 9 Hz bilaterally with quiet wakefulness and eye closing.  Drowsiness was recorded with a dropout of posterior dominant rhythm and increased theta frequency activity.  Normal sleep was recorded as well with POSTS, K complexes and vertex waves.  The patient's Keppra had been stopped during this recording period.  There were infrequent generalized spike and wave discharges, most were independent and single but there were some in bursts lasting less than 2 seconds.  There was no clinical correlate to any of these discharges.  The patient had multiple events of jerking movements, similar to the previous day.  She recorded movements of the upper body, arms, lower extremities, left shoulder and right shoulder.  Times of these included 10/31/2017 at 12:02:56, 10/31/2017 at 14:20:63, 10/31/2017 at 18:28:37.  Approximately 8 events occurred during this recording.  Video review showed all events were low amplitude and not typical for an epileptic myoclonic seizure.  The patient also reported a staring event.  The EEG during, before and afterwards was reviewed closely and there were no changes during that time period.  Photic stimulation was performed on this day with no abnormalities occurring during that time period.      IMPRESSION:  This is an abnormal continuous video-EEG during the awake, drowsy and sleep states due to infrequent generalized spike and wave discharges.  Of note, the patient had been taken off of her Keppra for this recording.  The patient continued to have small amplitude jerking movements with no EEG correlate.  She had a  staring event with no EEG correlate.      This EEG is consistent with an underlying generalized epilepsy (appears well controlled while on medication), but the events recorded including jerking movements and the staring event had no EEG correlate and were not epileptic.  Monitoring was continued for another day.         LALO AUGUST MD             D: 2017 14:29   T: 2017 16:47   MT: CD      Name:     DAVIAN LÓPEZ   MRN:      [id number]        Account:        [id number]   :      1980           Procedure Date: 10/31/2017      Document: [id number]

## 2017-11-06 NOTE — PROCEDURES
ELECTROENCEPHALOGRAM      ORDERING PHYSICIAN:  Lalo August MD      EEG # 17-015C, LTV Day 3 started on 2017 to 2017      This is a continuous video-EEG performed on Davian Patricia in the standard [employer] 10-20 electrode system.  The awake activity showed mainly alpha frequency and is symmetric.  There is a posterior dominant rhythm of 9 Hz bilaterally in quiet wakefulness and eye closure.  Drowsiness was recorded with dropout of posterior dominant rhythm and increased theta frequency activity.  Sleepiness was recorded with normal POSTS, vertex waves, K complexes and slow wave sleep.  The patient had a few small jerking episodes during this recording.  This includes in the right thigh, right leg.  These events occurred on 2017 at 17:45:19 and 2017 at 23:17:07.  There were no EEG correlates to these events.  These events remained very mild and low amplitude.  No further generalized discharges were seen on the recording that had been seen on the previous day.  The recording was stopped on the day of 2017.      IMPRESSION:  This is a normal EEG during the awake, drowsy and sleep states.  The patient had 2 of her typical jerking events which were low amplitude and showed no EEG correlate.  These were not consistent with seizures.      Overall, in this hospitalization, the background EEG was normal.  When the patient was taken off the Keppra, generalized discharges did occur.  She was put back on Keppra and the generalized discharges did not occur again.  The patient had multiple jerking movements and a few staring events, none of which had EEG correlates and were not epileptic in nature.         LALO AUGUST MD             D: 2017 14:36   T: 2017 16:53   MT:       Name:     DAVIAN PATRICIA   MRN:      [id number]        Account:        [id number]   :      1980           Procedure Date: 2017      Document: [id number]        cc: Indira Terrell MD

## 2018-02-12 ENCOUNTER — TRANSFERRED RECORDS (OUTPATIENT)
Dept: HEALTH INFORMATION MANAGEMENT | Facility: CLINIC | Age: 38
End: 2018-02-12

## 2018-05-15 NOTE — TELEPHONE ENCOUNTER
FUTURE VISIT INFORMATION      FUTURE VISIT INFORMATION:    Date: 05/18/2018    Time:     Location:   REFERRAL INFORMATION:    Referring provider:  Anthony Nunez MD    Referring providers clinic:      Reason for visit/diagnosis          RECORDS STATUS      Internal Records: [facility], Care Everywhere and PACS.

## 2018-05-18 ENCOUNTER — OFFICE VISIT (OUTPATIENT)
Dept: NEUROLOGY | Facility: CLINIC | Age: 38
End: 2018-05-18
Payer: COMMERCIAL

## 2018-05-18 ENCOUNTER — PRE VISIT (OUTPATIENT)
Dept: NEUROLOGY | Facility: CLINIC | Age: 38
End: 2018-05-18

## 2018-05-18 ENCOUNTER — APPOINTMENT (OUTPATIENT)
Dept: LAB | Facility: CLINIC | Age: 38
End: 2018-05-18
Payer: COMMERCIAL

## 2018-05-18 VITALS
HEIGHT: 67 IN | RESPIRATION RATE: 28 BRPM | OXYGEN SATURATION: 96 % | BODY MASS INDEX: 45.99 KG/M2 | WEIGHT: 293 LBS | DIASTOLIC BLOOD PRESSURE: 82 MMHG | TEMPERATURE: 98.2 F | SYSTOLIC BLOOD PRESSURE: 121 MMHG | HEART RATE: 88 BPM

## 2018-05-18 DIAGNOSIS — G40.319 GENERALIZED NONCONVULSIVE EPILEPSY WITH INTRACTABLE EPILEPSY (H): Primary | ICD-10-CM

## 2018-05-18 DIAGNOSIS — G40.309 GENERALIZED TONIC CLONIC EPILEPSY (H): ICD-10-CM

## 2018-05-18 DIAGNOSIS — R56.9 SEIZURE (H): Primary | ICD-10-CM

## 2018-05-18 PROBLEM — F43.10 PTSD (POST-TRAUMATIC STRESS DISORDER): Status: ACTIVE | Noted: 2018-05-03

## 2018-05-18 PROBLEM — F12.10 MILD TETRAHYDROCANNABINOL (THC) ABUSE: Status: ACTIVE | Noted: 2017-06-23

## 2018-05-18 PROBLEM — R73.09 INCREASED GLUCOSE LEVEL: Status: ACTIVE | Noted: 2017-05-05

## 2018-05-18 PROBLEM — K58.9 IBS (IRRITABLE BOWEL SYNDROME): Status: ACTIVE | Noted: 2018-05-18

## 2018-05-18 PROBLEM — A54.9 GONORRHEA: Status: ACTIVE | Noted: 2017-06-23

## 2018-05-18 PROBLEM — Z79.899 MEDICAL MARIJUANA USE: Status: ACTIVE | Noted: 2017-08-10

## 2018-05-18 PROBLEM — J45.20 MILD INTERMITTENT ASTHMA WITHOUT COMPLICATION: Status: ACTIVE | Noted: 2017-06-23

## 2018-05-18 PROBLEM — I45.6 WPW (WOLFF-PARKINSON-WHITE SYNDROME): Status: ACTIVE | Noted: 2017-06-23

## 2018-05-18 PROBLEM — G40.001: Status: ACTIVE | Noted: 2017-05-05

## 2018-05-18 PROBLEM — B00.9 HERPES SIMPLEX: Status: ACTIVE | Noted: 2017-06-23

## 2018-05-18 PROBLEM — F39 EPISODIC MOOD DISORDER (H): Status: ACTIVE | Noted: 2017-04-12

## 2018-05-18 PROBLEM — G47.00 INSOMNIA: Status: ACTIVE | Noted: 2017-06-28

## 2018-05-18 PROBLEM — F33.9 DEPRESSION, RECURRENT (H): Status: ACTIVE | Noted: 2017-06-08

## 2018-05-18 PROBLEM — F41.9 ANXIETY: Status: ACTIVE | Noted: 2017-06-08

## 2018-05-18 RX ORDER — DICLOFENAC SODIUM 100 MG/1
100 TABLET, FILM COATED, EXTENDED RELEASE ORAL DAILY
Status: ON HOLD | COMMUNITY
Start: 2018-05-17 | End: 2023-03-16

## 2018-05-18 RX ORDER — ACETAMINOPHEN 10 MG/ML
1000 INJECTION, SOLUTION INTRAVENOUS PRN
COMMUNITY
End: 2018-05-18

## 2018-05-18 RX ORDER — BACLOFEN 10 MG/1
10 TABLET ORAL 2 TIMES DAILY
Status: ON HOLD | COMMUNITY
Start: 2018-05-17 | End: 2023-03-16

## 2018-05-18 RX ORDER — CLONAZEPAM 1 MG/1
1 TABLET ORAL DAILY
Status: ON HOLD | COMMUNITY
Start: 2018-03-26 | End: 2023-03-16

## 2018-05-18 RX ORDER — ALBUTEROL SULFATE 90 UG/1
1-2 AEROSOL, METERED RESPIRATORY (INHALATION) PRN
COMMUNITY
Start: 2017-05-05

## 2018-05-18 RX ORDER — LEVETIRACETAM 500 MG/1
1500 TABLET ORAL 2 TIMES DAILY
Status: ON HOLD | COMMUNITY
Start: 2017-05-05 | End: 2023-03-23

## 2018-05-18 RX ORDER — RIZATRIPTAN BENZOATE 5 MG/1
1 TABLET, ORALLY DISINTEGRATING ORAL
COMMUNITY
Start: 2018-03-15

## 2018-05-18 ASSESSMENT — ENCOUNTER SYMPTOMS
ABDOMINAL PAIN: 0
INSOMNIA: 1
DIARRHEA: 1
CONSTIPATION: 0
HEADACHES: 1
MUSCLE CRAMPS: 1
MEMORY LOSS: 1
BACK PAIN: 1
JAUNDICE: 0
NUMBNESS: 1
LOSS OF CONSCIOUSNESS: 0
DEPRESSION: 1
STIFFNESS: 0
MUSCLE WEAKNESS: 0
VOMITING: 0
NAUSEA: 0
TROUBLE SWALLOWING: 0
DIZZINESS: 1
PANIC: 1
MYALGIAS: 1
DISTURBANCES IN COORDINATION: 1
NECK PAIN: 0
TREMORS: 1
BLOOD IN STOOL: 0
DECREASED CONCENTRATION: 1
SINUS PAIN: 0
SPEECH CHANGE: 0
PARALYSIS: 0
BOWEL INCONTINENCE: 0
SMELL DISTURBANCE: 0
NECK MASS: 0
JOINT SWELLING: 1
HOARSE VOICE: 0
SEIZURES: 1
TINGLING: 1
BLOATING: 0
HEARTBURN: 1
RECTAL PAIN: 0
WEAKNESS: 1
TASTE DISTURBANCE: 0
NERVOUS/ANXIOUS: 1
SORE THROAT: 0
ARTHRALGIAS: 1
SINUS CONGESTION: 0

## 2018-05-18 ASSESSMENT — PAIN SCALES - GENERAL: PAINLEVEL: SEVERE PAIN (6)
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
## 2018-05-18 NOTE — MR AVS SNAPSHOT
After Visit Summary   2018    Estelita Patricia    MRN: [id number]           Patient Information     Date Of Birth          1980        Visit Information        Provider Department      2018 12:00 PM  EEG TECH 2 EEG [facility] OUTPATIENT        Today's Diagnoses     Seizure (H)    -  1       Follow-ups after your visit        Your next 10 appointments already scheduled     May 30, 2018  4:45 PM CDT   Telephone Call with Blaise Parrish MD   [facility] ([facility])    [address], [address]  [place] [address] [phone]           Note: This is not an onsite visit; there is no need to come to the facility.              Who to contact     Please call your clinic at [phone] to:    Ask questions about your health    Make or cancel appointments    Discuss your medicines    Learn about your test results    Speak to your doctor            Additional Information About Your Visit        MyChart Information     [employer] is an electronic gateway that provides easy, online access to your medical records. With [employer], you can request a clinic appointment, read your test results, renew a prescription or communicate with your care team.     To sign up for [employer] visit the website at [email or web address]   You will be asked to enter the access code listed below, as well as some personal information. Please follow the directions to create your username and password.     Your access code is: [email or web address]  Expires: 2018  6:30 AM     Your access code will  in 90 days. If you need help or a new code, please contact your [facility] or call [phone] for assistance.        Care EveryWhere ID     This is your Care EveryWhere ID. This could be used by other organizations to access your [place] medical records  [id number]         Blood Pressure from Last 3 Encounters:   No data found for BP    Weight from  Last 3 Encounters:   No data found for Wt              Today, you had the following     No orders found for display         Today's Medication Changes          These changes are accurate as of 5/18/18 11:59 PM.  If you have any questions, ask your nurse or doctor.               These medicines have changed or have updated prescriptions.        Dose/Directions    levETIRAcetam 500 MG tablet   Commonly known as:  KEPPRA   This may have changed:  Another medication with the same name was removed. Continue taking this medication, and follow the directions you see here.   Changed by:  Blaise Parrish MD        Dose:  1500 mg   Take 1,500 mg by mouth 2 times daily   Refills:  0       rizatriptan 5 MG ODT tab   Commonly known as:  MAXALT-MLT   This may have changed:  Another medication with the same name was removed. Continue taking this medication, and follow the directions you see here.   Changed by:  Blaise Parrish MD        Dose:  1 tablet   Take 1 tablet by mouth as needed   Refills:  0         Stop taking these medicines if you haven't already. Please contact your care team if you have questions.     ZOLOFT PO   Stopped by:  Blaise Parrish MD                    Primary Care Provider Office Phone # Fax #    Ailyn Pauljoao, PATRIA [phone]       [place] [address]        Equal Access to Services     [facility]: Hadii pro ku hadasho Soomaali, waaxda luqadaha, qaybta kaalmada adeegyada, waxay idiin hayaan adecheo khlucas baker . So [facility] [phone].    ATENCIÓN: Si habla español, tiene a mckeon disposición servicios gratuitos de asistencia lingüística. Tiffany al [phone].    We comply with applicable federal civil rights laws and [place] laws. We do not discriminate on the basis of race, color, national origin, age, disability, sex, sexual orientation, or gender identity.            Thank you!     Thank  you for choosing EEG [facility]PAT[facility]  for your care. Our goal is always to provide you with excellent care. Hearing back from our patients is one way we can continue to improve our services. Please take a few minutes to complete the written survey that you may receive in the mail after your visit with us. Thank you!             Your Updated Medication List - Protect others around you: Learn how to safely use, store and throw away your medicines at www.disposemymeds.org.          This list is accurate as of 5/18/18 11:59 PM.  Always use your most recent med list.                   Brand Name Dispense Instructions for use Diagnosis    ACETAMINOPHEN PO      Take 1,000 mg by mouth At Bedtime        albuterol 108 (90 Base) MCG/ACT Inhaler    PROAIR HFA/PROVENTIL HFA/VENTOLIN HFA     Inhale 1-2 puffs into the lungs as needed        AMBIEN PO      Take 10 mg by mouth nightly as needed for sleep        amitriptyline 25 MG tablet    ELAVIL     Take 2 tablets by mouth At Bedtime        baclofen 10 MG tablet    LIORESAL     Take 10 mg by mouth 2 times daily        clonazePAM 1 MG tablet    klonoPIN     Take 1 mg by mouth daily        diclofenac 100 MG 24 hr tablet    VOLTAREN XR     Take 100 mg by mouth daily        levETIRAcetam 500 MG tablet    KEPPRA     Take 1,500 mg by mouth 2 times daily        medical cannabis liquid      Take 0.5 mLs by mouth 2 times daily        rizatriptan 5 MG ODT tab    MAXALT-MLT     Take 1 tablet by mouth as needed

## 2018-05-18 NOTE — MR AVS SNAPSHOT
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 This does not apply to pregnant women. Water, hot tea and black coffee (with nothing added) are okay. Do not drink other fluids, diet soda or chew gum.            May 23, 2018 12:30 PM CDT   Portable EEG with [facility] PORTABLE EEG   [facility] ([facility])    [address], [address]  [facility] [id number] [phone]            May 24, 2018 12:30 PM CDT   Portable EEG with [facility] PORTABLE EEG   [facility] ([facility])    [address], [address]  [facility] [id number] [phone]            May 30, 2018  4:45 PM CDT   Telephone Call with Blaise Parrish MD   [facility] ([facility])    [address], [address]  [facility] [id number] [phone]           Note: This is not an onsite visit; there is no need to come to the facility.              Future tests that were ordered for you today     Open Future Orders        Priority Expected Expires Ordered    24 hour Ambulatory EEG (99100) Routine  5/18/2019 5/18/2018            Who to contact     Please call your clinic at [phone] to:    Ask questions about your health    Make  "or cancel appointments    Discuss your medicines    Learn about your test results    Speak to your doctor            Additional Information About Your Visit        Next Pointshart Information     [employer] is an electronic gateway that provides easy, online access to your medical records. With [employer], you can request a clinic appointment, read your test results, renew a prescription or communicate with your care team.     To sign up for [employer] visit the website at [email or web address]   You will be asked to enter the access code listed below, as well as some personal information. Please follow the directions to create your username and password.     Your access code is: [email or web address]  Expires: 2018  6:30 AM     Your access code will  in 90 days. If you need help or a new code, please contact your [facility] or call [phone] for assistance.        Care EveryWhere ID     This is your Care EveryWhere ID. This could be used by other organizations to access your [facility] medical records  [id number]        Your Vitals Were     Pulse Temperature Respirations Height Pulse Oximetry Breastfeeding?    88 98.2  F (36.8  C) (Oral) 28 1.695 m (5' 6.75\") 96% No    BMI (Body Mass Index)                   51.57 kg/m2            Blood Pressure from Last 3 Encounters:   18 121/82   17 107/63    Weight from Last 3 Encounters:   18 148.2 kg (326 lb 12.8 oz)   10/30/17 (!) 147.7 kg (325 lb 9.9 oz)              We Performed the Following     Keppra (Levetiracetam) Level          Today's Medication Changes          These changes are accurate as of 18  4:25 PM.  If you have any questions, ask your nurse or doctor.               These medicines have changed or have updated prescriptions.        Dose/Directions    levETIRAcetam 500 MG tablet   Commonly known as:  KEPPRA   This may have changed:  Another medication with the same name was removed. Continue taking this " medication, and follow the directions you see here.   Changed by:  Blaise Parrish MD        Dose:  1500 mg   Take 1,500 mg by mouth 2 times daily   Refills:  0       rizatriptan 5 MG ODT tab   Commonly known as:  MAXALT-MLT   This may have changed:  Another medication with the same name was removed. Continue taking this medication, and follow the directions you see here.   Changed by:  Blaise Parrish MD        Dose:  1 tablet   Take 1 tablet by mouth as needed   Refills:  0         Stop taking these medicines if you haven't already. Please contact your care team if you have questions.     ZOLOFT PO   Stopped by:  Blaise Parrish MD                    Primary Care Provider Office Phone # Fax Waqar CrouchPATRIA [phone]       [place] [address]        Equal Access to Services     [facility]: Hadii aad ku hadasho Soomaali, waaxda luqadaha, qaybta kaalmada adeegyada, waxay kierain hayaan devan baker . So [facility] [phone].    ATENCIÓN: Si habla español, tiene a mckeon disposición servicios gratuitos de asistencia lingüística. Tiffany al [phone].    We comply with applicable federal civil rights laws and [place] laws. We do not discriminate on the basis of race, color, national origin, age, disability, sex, sexual orientation, or gender identity.            Thank you!     Thank you for choosing [facility] NEUROLOGY  for your care. Our goal is always to provide you with excellent care. Hearing back from our patients is one way we can continue to improve our services. Please take a few minutes to complete the written survey that you may receive in the mail after your visit with us. Thank you!             Your Updated Medication List - Protect others around you: Learn how to safely use, store and throw away your medicines at www.disposemymeds.org.          This list is accurate as of 5/18/18   4:25 PM.  Always use your most recent med list.                   Brand Name Dispense Instructions for use Diagnosis    ACETAMINOPHEN PO      Take 1,000 mg by mouth At Bedtime        albuterol 108 (90 Base) MCG/ACT Inhaler    PROAIR HFA/PROVENTIL HFA/VENTOLIN HFA     Inhale 1-2 puffs into the lungs as needed        AMBIEN PO      Take 10 mg by mouth nightly as needed for sleep        amitriptyline 25 MG tablet    ELAVIL     Take 2 tablets by mouth At Bedtime        baclofen 10 MG tablet    LIORESAL     Take 10 mg by mouth 2 times daily        clonazePAM 1 MG tablet    klonoPIN     Take 1 mg by mouth daily        diclofenac 100 MG 24 hr tablet    VOLTAREN XR     Take 100 mg by mouth daily        levETIRAcetam 500 MG tablet    KEPPRA     Take 1,500 mg by mouth 2 times daily        medical cannabis liquid      Take 0.5 mLs by mouth 2 times daily        rizatriptan 5 MG ODT tab    MAXALT-MLT     Take 1 tablet by mouth as needed

## 2018-05-19 LAB — LEVETIRACETAM SERPL-MCNC: <2 UG/ML (ref 12–46)

## 2018-05-21 NOTE — PROCEDURES
Procedure Date: 05/18/2018     EEG #:  [id number]     TYPE OF PROCEDURE: Outpatient video-EEG monitoring     DURATION OF RECORDING:  3 hours 8 minutes 40 seconds.      HISTORY:  A 37-year-old with multiple medical problems including Crohn's disease, depression, fatty liver, Ulis-Parkinson-White.  She has been diagnosed with myoclonic and tonic-clonic seizures.  She reports worsening symptoms of twitching and blank staring.  She is being treated with levetiracetam.     TECHNICAL SUMMARY: This continuous video- EEG monitoring procedure was performed with 23 scalp electrodes in 10-20 electrode system placements, and additional scalp, precordial and other surface electrodes used for electrical referencing and artifact detection.  Video monitoring was utilized and periodically reviewed by EEG technologists and the physician for electroclinical correlations.    FINDINGS:  A 9-10 Hz posterior dominant rhythm, symmetrical, reactive.  Long periods of sleep are recorded with some vertex waves and sleep spindles.  Hyperventilation and photic stimulation performed does not induce any abnormalities.      INTERICTAL/ICTAL:  A single spike transient is noted at 12:49:18 followed by arousal.  Video shows rapid bilateral flexor jerk of arms and legs.  The patient is N1 sleep prior to the spell.  Following the spike transient, a diffuse alpha is seen.  It is not quite clear whether the spike transient is an epileptiform transient or not.  The clinical features could be consistent with either a hypnic jerk or a myoclonic jerk.  It is noteworthy that the patient did not enzo this spell. She was not aware that it had occurred when asked about it in clinic later that afternoon.     IMPRESSION:  Normal background and normal lengthy sleep.  A single spike transient is noted associated with a jerk occurring out of sleep.  The spell was not marked by the patient.  It is not clear whether this constitutes a hypnic jerk or a cortically-based  myoclonic jerk.  Overall, a hypnic jerk is more likely.  Otherwise, EEG showed no abnormalities.    Patient was subsequently asked whether she had experienced a spell during the recording and she stated that she had not.         BENI REZA MD             D: 2018   T: 2018   MT: raymundo      Name:     DAVIAN LÓPEZ   MRN:      [id number]        Account:        [id number]   :      1980           Procedure Date: 2018      Document: [id number]
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
## 2018-05-30 ENCOUNTER — VIRTUAL VISIT (OUTPATIENT)
Dept: NEUROLOGY | Facility: CLINIC | Age: 38
End: 2018-05-30
Payer: COMMERCIAL

## 2018-05-30 DIAGNOSIS — G40.309 GENERALIZED TONIC CLONIC EPILEPSY (H): Primary | ICD-10-CM

## 2018-05-30 NOTE — MR AVS SNAPSHOT
After Visit Summary   2018    Estelita Patricia    MRN: [id number]           Patient Information     Date Of Birth          1980        Visit Information        Provider Department      2018 4:45 PM Blaise Parrish MD [facility]        Today's Diagnoses     Generalized tonic clonic epilepsy (H)    -  1       Follow-ups after your visit        Who to contact     Please call your clinic at [phone] to:    Ask questions about your health    Make or cancel appointments    Discuss your medicines    Learn about your test results    Speak to your doctor            Additional Information About Your Visit        MyChart Information     [employer] is an electronic gateway that provides easy, online access to your medical records. With [employer], you can request a clinic appointment, read your test results, renew a prescription or communicate with your care team.     To sign up for [employer] visit the website at [email or web address]   You will be asked to enter the access code listed below, as well as some personal information. Please follow the directions to create your username and password.     Your access code is: [email or web address]  Expires: 2018  6:30 AM     Your access code will  in 90 days. If you need help or a new code, please contact your [facility] or call [phone] for assistance.        Care EveryWhere ID     This is your Care EveryWhere ID. This could be used by other organizations to access your [place] medical records  [id number]         Blood Pressure from Last 3 Encounters:   18 121/82   17 107/63    Weight from Last 3 Encounters:   18 326 lb 12.8 oz (148.2 kg)   10/30/17 (!) 325 lb 9.9 oz (147.7 kg)              Today, you had the following     No orders found for display       Primary Care Provider Office Phone # Fax #    Ailyn Crouch [phone]       [facility]      [address]              [facility] [address]        Equal Access to Services     TRUDYBRANDON MORIS : Hadii pro carmona hadirwineli Somiguelali, waaxda luqadaha, qaybta kaalmada devanchayocandi, mike tam jahairalizett mayorgalauriejian baker . So [facility] [phone].    ATENCIÓN: Si habla español, tiene a mckeon disposición servicios gratuitos de asistencia lingüística. Llame al [phone].    We comply with applicable federal civil rights laws and [place] laws. We do not discriminate on the basis of race, color, national origin, age, disability, sex, sexual orientation, or gender identity.            Thank you!     Thank you for choosing [facility]  for your care. Our goal is always to provide you with excellent care. Hearing back from our patients is one way we can continue to improve our services. Please take a few minutes to complete the written survey that you may receive in the mail after your visit with us. Thank you!             Your Updated Medication List - Protect others around you: Learn how to safely use, store and throw away your medicines at www.disposemymeds.org.          This list is accurate as of 5/30/18  5:29 PM.  Always use your most recent med list.                   Brand Name Dispense Instructions for use Diagnosis    ACETAMINOPHEN PO      Take 1,000 mg by mouth At Bedtime        albuterol 108 (90 Base) MCG/ACT Inhaler    PROAIR HFA/PROVENTIL HFA/VENTOLIN HFA     Inhale 1-2 puffs into the lungs as needed        AMBIEN PO      Take 10 mg by mouth nightly as needed for sleep        amitriptyline 25 MG tablet    ELAVIL     Take 2 tablets by mouth At Bedtime        baclofen 10 MG tablet    LIORESAL     Take 10 mg by mouth 2 times daily        clonazePAM 1 MG tablet    klonoPIN     Take 1 mg by mouth daily        diclofenac 100 MG 24 hr tablet    VOLTAREN XR     Take 100 mg by mouth daily        levETIRAcetam 500 MG tablet    KEPPRA     Take 1,500 mg by mouth 2 times daily        medical cannabis liquid       Take 0.5 mLs by mouth 2 times daily        rizatriptan 5 MG ODT tab    MAXALT-MLT     Take 1 tablet by mouth as needed

## 2018-05-30 NOTE — LETTER
5/30/2018       RE: Estelita Patricia  [address]  [place] [address]      Dear Ms Patricia,    We were unable to connect for our scheduled phone visit today.    When we saw each other in clinic we asked you to get ambulatory EEG done to confirm the findings at [facility]. As best as I can tell from our electronic medical records this has not been done. It is important to complete this test so that we can give you the best possible treatment.    The blood level done during our clinic visit showed there is no levetiracetam (keppra) at all in your blood. Essentially this means you are not taking the medication. I am very concerned about this because we know that you have occasional convulsive epileptic seizures that the levetiracetam controls. Convulsive seizures can be dangerous causing severe trauma and rarely, even death. It is therefore extremely important that you take your medications.    Please call so that we can talk about restarting your medications. We should wait on the EEG until after you have been taking the medications for at least three days. Our clinic number is [phone].    We will continue to keep your providers at the [facility] updated about your situation.    Sincerely,    Blaise Parrish MD    Cc: Anthony Nunez MD  [facility]

## 2022-02-16 NOTE — PROGRESS NOTES
Called. No response. Left message.  It appears ambulatory EEG was not done. Also levetiracetam level was essentially nonexistent indicating she was not taking this medication at the time of the clinic visit.    Asked to call back. Wrote letter regarding the above to patient. Tommy Nunez   dyspnea/cough

## 2023-01-04 ENCOUNTER — TRANSCRIBE ORDERS (OUTPATIENT)
Dept: OTHER | Age: 43
End: 2023-01-04

## 2023-01-04 DIAGNOSIS — G40.001 PARTIAL IDIOPATHIC EPILEPSY WITH SEIZURES OF LOCALIZED ONSET, NOT INTRACTABLE, WITH STATUS EPILEPTICUS (H): Primary | ICD-10-CM

## 2023-03-01 ENCOUNTER — ANCILLARY PROCEDURE (OUTPATIENT)
Dept: NEUROLOGY | Facility: CLINIC | Age: 43
End: 2023-03-01
Payer: COMMERCIAL

## 2023-03-01 ENCOUNTER — OFFICE VISIT (OUTPATIENT)
Dept: NEUROLOGY | Facility: CLINIC | Age: 43
End: 2023-03-01
Payer: COMMERCIAL

## 2023-03-01 VITALS
HEART RATE: 72 BPM | TEMPERATURE: 97.6 F | HEIGHT: 67 IN | DIASTOLIC BLOOD PRESSURE: 76 MMHG | SYSTOLIC BLOOD PRESSURE: 126 MMHG | WEIGHT: 290 LBS | BODY MASS INDEX: 45.52 KG/M2

## 2023-03-01 DIAGNOSIS — G40.309 GENERALIZED TONIC CLONIC EPILEPSY (H): Primary | ICD-10-CM

## 2023-03-01 DIAGNOSIS — R56.9 SEIZURES (H): ICD-10-CM

## 2023-03-01 LAB
AST SERPL W P-5'-P-CCNC: 19 U/L (ref 10–35)
LEVETIRACETAM SERPL-MCNC: 17.1 ΜG/ML (ref 10–40)

## 2023-03-01 PROCEDURE — 84450 TRANSFERASE (AST) (SGOT): CPT | Performed by: PSYCHIATRY & NEUROLOGY

## 2023-03-01 PROCEDURE — 80177 DRUG SCRN QUAN LEVETIRACETAM: CPT | Performed by: PSYCHIATRY & NEUROLOGY

## 2023-03-01 RX ORDER — LAMOTRIGINE 25 MG/1
25 TABLET ORAL 2 TIMES DAILY
COMMUNITY
Start: 2023-02-24 | End: 2023-05-23

## 2023-03-01 RX ORDER — ARIPIPRAZOLE 2 MG/1
2 TABLET ORAL DAILY
COMMUNITY
Start: 2023-02-10

## 2023-03-01 RX ORDER — CYCLOBENZAPRINE HCL 10 MG
10 TABLET ORAL 3 TIMES DAILY
COMMUNITY
Start: 2021-04-23

## 2023-03-01 RX ORDER — LEVETIRACETAM 500 MG/1
1500 TABLET ORAL
Status: ON HOLD | COMMUNITY
Start: 2022-08-30 | End: 2023-03-16

## 2023-03-01 RX ORDER — LORAZEPAM 1 MG/1
1 TABLET ORAL DAILY PRN
COMMUNITY
Start: 2023-02-24

## 2023-03-01 RX ORDER — DILTIAZEM HYDROCHLORIDE 120 MG/1
120 CAPSULE, EXTENDED RELEASE ORAL DAILY
COMMUNITY
Start: 2022-08-30

## 2023-03-01 ASSESSMENT — ANXIETY QUESTIONNAIRES
3. WORRYING TOO MUCH ABOUT DIFFERENT THINGS: NEARLY EVERY DAY
1. FEELING NERVOUS, ANXIOUS, OR ON EDGE: NEARLY EVERY DAY
2. NOT BEING ABLE TO STOP OR CONTROL WORRYING: MORE THAN HALF THE DAYS
6. BECOMING EASILY ANNOYED OR IRRITABLE: NEARLY EVERY DAY
GAD7 TOTAL SCORE: 16
GAD7 TOTAL SCORE: 16
7. FEELING AFRAID AS IF SOMETHING AWFUL MIGHT HAPPEN: MORE THAN HALF THE DAYS
5. BEING SO RESTLESS THAT IT IS HARD TO SIT STILL: SEVERAL DAYS

## 2023-03-01 ASSESSMENT — PATIENT HEALTH QUESTIONNAIRE - PHQ9
SUM OF ALL RESPONSES TO PHQ QUESTIONS 1-9: 15
5. POOR APPETITE OR OVEREATING: MORE THAN HALF THE DAYS

## 2023-03-01 ASSESSMENT — PAIN SCALES - GENERAL: PAINLEVEL: SEVERE PAIN (7)
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
## 2023-03-16 ENCOUNTER — HOSPITAL ENCOUNTER (INPATIENT)
Dept: NEUROLOGY | Facility: CLINIC | Age: 43
Discharge: HOME | End: 2023-03-16
Attending: PSYCHIATRY & NEUROLOGY | Admitting: PSYCHIATRY & NEUROLOGY
Payer: COMMERCIAL

## 2023-03-16 ENCOUNTER — HOSPITAL ENCOUNTER (INPATIENT)
Facility: CLINIC | Age: 43
LOS: 7 days | Discharge: HOME | End: 2023-03-23
Attending: PSYCHIATRY & NEUROLOGY | Admitting: PSYCHIATRY & NEUROLOGY
Payer: COMMERCIAL

## 2023-03-16 DIAGNOSIS — Z92.89 H/O ELECTROENCEPHALOGRAM: ICD-10-CM

## 2023-03-16 DIAGNOSIS — R56.9 SEIZURE (H): ICD-10-CM

## 2023-03-16 DIAGNOSIS — G40.309 GENERALIZED EPILEPSY (H): Primary | ICD-10-CM

## 2023-03-16 DIAGNOSIS — L30.4 INTERTRIGO: ICD-10-CM

## 2023-03-16 PROCEDURE — 999N000127 HC STATISTIC PERIPHERAL IV START W US GUIDANCE

## 2023-03-16 PROCEDURE — 95720 EEG PHY/QHP EA INCR W/VEEG: CPT | Performed by: PSYCHIATRY & NEUROLOGY

## 2023-03-16 PROCEDURE — 120N000002 HC R&B MED SURG/OB UMMC

## 2023-03-16 PROCEDURE — 95714 VEEG EA 12-26 HR UNMNTR: CPT

## 2023-03-16 PROCEDURE — 99222 1ST HOSP IP/OBS MODERATE 55: CPT | Mod: 25 | Performed by: PHYSICIAN ASSISTANT

## 2023-03-16 PROCEDURE — 99207 EEG VIDEO 12-26 HR UNMONITORED: CPT | Performed by: PSYCHIATRY & NEUROLOGY

## 2023-03-16 PROCEDURE — 250N000013 HC RX MED GY IP 250 OP 250 PS 637: Performed by: PHYSICIAN ASSISTANT

## 2023-03-16 RX ORDER — RIZATRIPTAN BENZOATE 5 MG/1
5 TABLET, ORALLY DISINTEGRATING ORAL
Status: DISCONTINUED | OUTPATIENT
Start: 2023-03-16 | End: 2023-03-23 | Stop reason: HOSPADM

## 2023-03-16 RX ORDER — DOCUSATE SODIUM 100 MG/1
100 CAPSULE, LIQUID FILLED ORAL 2 TIMES DAILY PRN
Status: DISCONTINUED | OUTPATIENT
Start: 2023-03-16 | End: 2023-03-19

## 2023-03-16 RX ORDER — LORAZEPAM 2 MG/ML
2 INJECTION INTRAMUSCULAR
Status: DISCONTINUED | OUTPATIENT
Start: 2023-03-16 | End: 2023-03-23 | Stop reason: HOSPADM

## 2023-03-16 RX ORDER — ARIPIPRAZOLE 2 MG/1
2 TABLET ORAL DAILY
Status: DISCONTINUED | OUTPATIENT
Start: 2023-03-17 | End: 2023-03-23 | Stop reason: HOSPADM

## 2023-03-16 RX ORDER — ACETAMINOPHEN 325 MG/1
650 TABLET ORAL EVERY 4 HOURS PRN
Status: DISCONTINUED | OUTPATIENT
Start: 2023-03-16 | End: 2023-03-23 | Stop reason: HOSPADM

## 2023-03-16 RX ORDER — CYCLOBENZAPRINE HCL 10 MG
10 TABLET ORAL 3 TIMES DAILY
Status: DISCONTINUED | OUTPATIENT
Start: 2023-03-16 | End: 2023-03-23 | Stop reason: HOSPADM

## 2023-03-16 RX ORDER — ALBUTEROL SULFATE 90 UG/1
1-2 AEROSOL, METERED RESPIRATORY (INHALATION) EVERY 6 HOURS PRN
Status: DISCONTINUED | OUTPATIENT
Start: 2023-03-16 | End: 2023-03-23 | Stop reason: HOSPADM

## 2023-03-16 RX ORDER — LIDOCAINE 40 MG/G
CREAM TOPICAL
Status: DISCONTINUED | OUTPATIENT
Start: 2023-03-16 | End: 2023-03-23 | Stop reason: HOSPADM

## 2023-03-16 RX ORDER — DILTIAZEM HYDROCHLORIDE 120 MG/1
120 CAPSULE, EXTENDED RELEASE ORAL DAILY
Status: DISCONTINUED | OUTPATIENT
Start: 2023-03-17 | End: 2023-03-16

## 2023-03-16 RX ORDER — LIDOCAINE HYDROCHLORIDE 20 MG/ML
5 SOLUTION OROPHARYNGEAL EVERY 6 HOURS PRN
Status: DISCONTINUED | OUTPATIENT
Start: 2023-03-16 | End: 2023-03-23 | Stop reason: HOSPADM

## 2023-03-16 RX ORDER — LEVETIRACETAM 750 MG/1
750 TABLET ORAL 2 TIMES DAILY
Status: DISCONTINUED | OUTPATIENT
Start: 2023-03-16 | End: 2023-03-17

## 2023-03-16 RX ORDER — DILTIAZEM HYDROCHLORIDE 120 MG/1
120 CAPSULE, COATED, EXTENDED RELEASE ORAL DAILY
Status: DISCONTINUED | OUTPATIENT
Start: 2023-03-17 | End: 2023-03-23 | Stop reason: HOSPADM

## 2023-03-16 RX ORDER — GINSENG 100 MG
CAPSULE ORAL 3 TIMES DAILY PRN
Status: DISCONTINUED | OUTPATIENT
Start: 2023-03-16 | End: 2023-03-23 | Stop reason: HOSPADM

## 2023-03-16 RX ORDER — LAMOTRIGINE 25 MG/1
25 TABLET ORAL 2 TIMES DAILY
Status: DISCONTINUED | OUTPATIENT
Start: 2023-03-16 | End: 2023-03-23 | Stop reason: HOSPADM

## 2023-03-16 RX ORDER — LEVETIRACETAM 750 MG/1
1500 TABLET ORAL 2 TIMES DAILY
Status: DISCONTINUED | OUTPATIENT
Start: 2023-03-16 | End: 2023-03-16

## 2023-03-16 RX ADMIN — CYCLOBENZAPRINE 10 MG: 10 TABLET, FILM COATED ORAL at 19:44

## 2023-03-16 RX ADMIN — CYCLOBENZAPRINE 10 MG: 10 TABLET, FILM COATED ORAL at 14:20

## 2023-03-16 RX ADMIN — AMITRIPTYLINE HYDROCHLORIDE 100 MG: 25 TABLET, FILM COATED ORAL at 21:30

## 2023-03-16 RX ADMIN — LEVETIRACETAM 750 MG: 750 TABLET, FILM COATED ORAL at 19:44

## 2023-03-16 RX ADMIN — LAMOTRIGINE 25 MG: 25 TABLET ORAL at 19:44

## 2023-03-16 RX ADMIN — ACETAMINOPHEN 650 MG: 325 TABLET ORAL at 14:20

## 2023-03-16 ASSESSMENT — ACTIVITIES OF DAILY LIVING (ADL)
CHANGE_IN_FUNCTIONAL_STATUS_SINCE_ONSET_OF_CURRENT_ILLNESS/INJURY: NO
DOING_ERRANDS_INDEPENDENTLY_DIFFICULTY: YES
ADLS_ACUITY_SCORE: 22
NUMBER_OF_TIMES_PATIENT_HAS_FALLEN_WITHIN_LAST_SIX_MONTHS: 1
DIFFICULTY_EATING/SWALLOWING: NO
ADLS_ACUITY_SCORE: 22
ADLS_ACUITY_SCORE: 35
ADLS_ACUITY_SCORE: 22
TOILETING_ISSUES: NO
VISION_MANAGEMENT: GLASSES
WEAR_GLASSES_OR_BLIND: YES
WALKING_OR_CLIMBING_STAIRS_DIFFICULTY: NO
CONCENTRATING,_REMEMBERING_OR_MAKING_DECISIONS_DIFFICULTY: NO
ADLS_ACUITY_SCORE: 35
DRESSING/BATHING_DIFFICULTY: NO
FALL_HISTORY_WITHIN_LAST_SIX_MONTHS: YES

## 2023-03-16 NOTE — PROGRESS NOTES
SPIRITUAL HEALTH SERVICES Progress Note  [facility] ([place]) 6A    I attempted to meet with Estelita per admission request. She shared that she is too tired today, having not slept last night. We agreed to follow up tomorrow.    Norman Corona  Chaplain Resident  Pager [phone]    * [facility] remains available 24/7 for emergent requests/referrals, either by having the switchboard page the on-call  or by entering an ASAP/STAT consult in Epic (this will also page the on-call ). Routine Epic consults receive an initial response within 24 hours.*
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
## 2023-03-16 NOTE — PLAN OF CARE
Status: Admit 3/16 for vEEG monitoring, no events this shift  Vitals: VSS  Neuros: AO x 4. BUE tremors. Strengths 5/5 throughout.   IV: PIV SL  Resp/trach: LS clear, on RA.  Diet: Regular diet  Bowel status: LBM PTA, pt reports constipation issues  : voiding spontaneously   Skin: see below. Edema to BLE.  Pain: c/o dental pain - prn tylenol given  Activity: SBA/GB  Social: no visitors   Plan: continue to monitor for seizure activity.       Arrived from: Home at 1100  Belongings/meds: remain with patient, no home meds  2 RN Skin Assessment Completed by: Julito     Non-intact findings documented (yes/no/NA): blanchable redness under L breast and pannus. Abrasion to right hand.

## 2023-03-16 NOTE — PHARMACY-ADMISSION MEDICATION HISTORY
Admission Medication History Completed by Pharmacy    See [facility] Admission Navigator for allergy information, preferred outpatient pharmacy, prior to admission medications and immunization status.     Medication History Sources:     Dispense History    Patient Estelita SY    Changes made to PTA medication list (reason):    Added: None    Deleted: None    Changed: None    Additional Information:    Patient admits to not being the best historian. Recently she has stopped filling her diltiazem. She does not recall if there was a conversation with her provider about this. I could not find any information in her chart to suggest she should be stopping the diltiazem. After discussion with Rasta, it was decided patient should resume the medication.    Prior to Admission medications    Medication Sig Last Dose Taking? Auth Provider Long Term End Date   ACETAMINOPHEN PO Take 1,000 mg by mouth At Bedtime Past Week Yes Unknown, Entered By History     amitriptyline (ELAVIL) 25 MG tablet Take 100 mg by mouth At Bedtime 3/15/2023 at hs Yes Reported, Patient Yes    ARIPiprazole (ABILIFY) 2 MG tablet Take 2 mg by mouth daily 3/16/2023 at am Yes Reported, Patient Yes    cyclobenzaprine (FLEXERIL) 10 MG tablet Take 10 mg by mouth 3 times daily 3/16/2023 at am Yes Reported, Patient     diltiazem ER (DILT-XR) 120 MG 24 hr capsule Take 120 mg by mouth daily 3/16/2023 at am Yes Reported, Patient Yes    lamoTRIgine (LAMICTAL) 25 MG tablet Take 25 mg by mouth 2 times daily 3/16/2023 at am Yes Reported, Patient Yes    levETIRAcetam (KEPPRA) 500 MG tablet Take 1,500 mg by mouth 2 times daily 3/16/2023 at am Yes Reported, Patient Yes    LORazepam (ATIVAN) 1 MG tablet Take 1 mg by mouth daily as needed Past Week Yes Reported, Patient     rizatriptan (MAXALT-MLT) 5 MG ODT tab Take 1 tablet by mouth at onset of headache Can repeat once in 2 hours if needed Past Week Yes Reported, Patient No    albuterol (PROAIR HFA/PROVENTIL  HFA/VENTOLIN HFA) 108 (90 Base) MCG/ACT Inhaler Inhale 1-2 puffs into the lungs as needed for shortness of breath or wheezing   Reported, Patient Yes    medical cannabis liquid Take 0.5 mLs by mouth 2 times daily [employer]   Reported, Patient         Date completed: 03/16/23    Medication history completed by: Eric Pop RPH

## 2023-03-17 ENCOUNTER — APPOINTMENT (OUTPATIENT)
Dept: NEUROLOGY | Facility: CLINIC | Age: 43
End: 2023-03-17
Attending: PHYSICIAN ASSISTANT
Payer: COMMERCIAL

## 2023-03-17 LAB — HOLD SPECIMEN: NORMAL

## 2023-03-17 PROCEDURE — 95714 VEEG EA 12-26 HR UNMNTR: CPT

## 2023-03-17 PROCEDURE — 95720 EEG PHY/QHP EA INCR W/VEEG: CPT | Performed by: PSYCHIATRY & NEUROLOGY

## 2023-03-17 PROCEDURE — 120N000002 HC R&B MED SURG/OB UMMC

## 2023-03-17 PROCEDURE — 99232 SBSQ HOSP IP/OBS MODERATE 35: CPT | Mod: 25 | Performed by: PHYSICIAN ASSISTANT

## 2023-03-17 PROCEDURE — 250N000013 HC RX MED GY IP 250 OP 250 PS 637: Performed by: PHYSICIAN ASSISTANT

## 2023-03-17 PROCEDURE — 250N000013 HC RX MED GY IP 250 OP 250 PS 637: Performed by: PSYCHIATRY & NEUROLOGY

## 2023-03-17 RX ADMIN — LAMOTRIGINE 25 MG: 25 TABLET ORAL at 19:58

## 2023-03-17 RX ADMIN — DILTIAZEM HYDROCHLORIDE 120 MG: 120 CAPSULE, COATED, EXTENDED RELEASE ORAL at 08:10

## 2023-03-17 RX ADMIN — AMITRIPTYLINE HYDROCHLORIDE 100 MG: 25 TABLET, FILM COATED ORAL at 21:26

## 2023-03-17 RX ADMIN — LAMOTRIGINE 25 MG: 25 TABLET ORAL at 08:10

## 2023-03-17 RX ADMIN — CYCLOBENZAPRINE 10 MG: 10 TABLET, FILM COATED ORAL at 19:58

## 2023-03-17 RX ADMIN — ARIPIPRAZOLE 2 MG: 2 TABLET ORAL at 08:10

## 2023-03-17 RX ADMIN — LEVETIRACETAM 750 MG: 750 TABLET, FILM COATED ORAL at 08:10

## 2023-03-17 RX ADMIN — CYCLOBENZAPRINE 10 MG: 10 TABLET, FILM COATED ORAL at 08:10

## 2023-03-17 RX ADMIN — CYCLOBENZAPRINE 10 MG: 10 TABLET, FILM COATED ORAL at 13:43

## 2023-03-17 ASSESSMENT — ACTIVITIES OF DAILY LIVING (ADL)
ADLS_ACUITY_SCORE: 22
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
## 2023-03-17 NOTE — PROGRESS NOTES
[facility], [place]   Epilepsy Service Daily Note      Interval History:   She reports multiple twitches yesterday but did not push event button. She has had several typical twitches today and has been pushing the button. No staring or shaking spells. Noted blood in toilet yesterday and was concerned. She thinks maybe she started her menses, although she usually notes cramps prior. She will continue to monitor.     Review of System:   No nausea, No vomiting, no headaches, no dizziness, no chest pain. Denies dysuria    Medications:   Antiepileptic Medications Home Doses: levetiracetam 3245-9070, lamotrigine 25-25  Antiepileptic Medications Current Doses: levetiracetam 750-750, lamotrigine 25-25    Exam: Blood pressure 116/71, pulse 101, temperature 98.9  F (37.2  C), temperature source Oral, resp. rate 16, weight 139.3 kg (307 lb 1.6 oz), SpO2 98 %, not currently breastfeeding.   General: NAD  Head: NC/AT  Extremities: no LE edema   Neuro: Alert and oriented. Speech fluent. Hearing intact to normal conversation. Pupils equal, round and reactive to light. EOM's intact. Face symmetric, tongue midline. Strong shoulder shrug bilaterally. Strength 5/5 bilaterally. No drift or pronation. Intact FNF. Sensation grossly intact to light touch    EEG: no electrographic seizures     Assessment and Plan: patient seen and discussed with Dr. Stewart   Patient is a 43 year old female with a history of migraines, Luis-Parkinson-White status post ablation, documented nonepileptic spells as well as multiple psychiatric diagnoses who is being admitted for vEEG to confirm all jerking events and staring events are nonepileptic and to characterize convulsions.    -continue vEEG monitoring  -only need to push event putton for bigger twitches/jerks  -stop levetiracetam       Kathy Alvarez PA-C    Type of target event identified: event with no loss of awareness, staring spell with altered awareness and convulsion    Number of events: more needed  Discharge medication plan: To be decided  Further Imaging studies needed prior to discharge: No imaging required prior to discharge  Discharge transportation: not discussed  Other pertinent issues/goals for discharge: possibly psychiatry consult       Total time: 35 minute was spent in the care of this patient. The patient agrees with the above mentioned plan of care. I answered all the patient's questions and addressed immediate concerns. More than 50% of time spent consisted of counseling and coordinating care, including discussion of the diagnostic significance of EEG findings, anti-seizure medication management, and planning for discharge home

## 2023-03-17 NOTE — PLAN OF CARE
VSS except intermittently tachycardic in the 100's.  On RA.  Denied pain.  A&O x 4.  Neuros intact except BUE tremors.  VEEG leads in place; no events witnessed or reported.  R PIV SL.  On a regular diet.  Voiding spontaneously.  Pt quite concerned about blood in toilet this AM.  After exam, appears to be menses although pt surprised since she hasn't had her period since December.  Tampons requested by pt.   Last BM 3/14 PTA.  Up SBA and GB.  BLE edema present.  Redness under breast, pannus and groin.  PCD's on overnight.  PTA Keppra decreased on 3/16, remains on full dose of PTA Lamictal.  Continue with POC.      Goal Outcome Evaluation:      Plan of Care Reviewed With: patient    Overall Patient Progress: no change

## 2023-03-17 NOTE — PLAN OF CARE
Status: Pt admitted for EEG monitoring. No events witnessed or reported this shift. See H&P for event types.   Vitals: VSS on RA.   Neuros: A&O x4. Denies N/T. Strengths 5/5. BUE tremors.   IV: PIV SL.   Labs/Electrolytes: WDL.  Resp/trach: LS clear. Denies SOB.   Diet: Regular.   Bowel status: BS+. LBM 3/14 PTA.   : Voids spontaneously.   Skin: EEG leads in place. Edema to BLE. Blanchable redness under breasts and pannus. Abrasion to R hand.   Pain: Denies.  Activity: Standby assist and GB.   Social: SO at bedside at start of shift.   Plan: Capture events.

## 2023-03-17 NOTE — SIGNIFICANT EVENT
Event pressed at 0916, 0918, 0920 for more twitching/jerking. Will clarify with PA if she would like an event note for every event.

## 2023-03-17 NOTE — SIGNIFICANT EVENT
Home care nurse returned the call to the clinic. She states that the patient has 1+ pitting edema. Patient has scabs and rashes on bilateral legs.     Routing to provider to please advise about referral    Alberta Person RN  [facility]     Per Kathy Barragan, patient to press event button for her Type 1 events - twitching/jerking.      Patient pressed event button at 0911 for a right leg twitch.

## 2023-03-17 NOTE — PLAN OF CARE
Status: Pt admitted for EEG monitoring. No events witnessed or reported this shift. See H&P for event types.   Vitals: VSS on RA.   Neuros: A&O x4. Denies N/T. Strengths 5/5. BUE tremors.   IV: PIV SL.   Labs/Electrolytes: WDL.  Resp/trach: LS clear. Denies SOB.   Diet: Regular.   Bowel status: BS+. LBM 3/14 PTA.   : Voids spontaneously. On menses  Skin: EEG leads in place. Edema to BLE. Blanchable redness under breasts and pannus. Abrasion to R hand.   Pain: Denies.  Activity: Standby assist and GB, seizure precautions  Plan: Capture events. Press event button for large twitches/jerks. Off Keppra.

## 2023-03-18 ENCOUNTER — APPOINTMENT (OUTPATIENT)
Dept: NEUROLOGY | Facility: CLINIC | Age: 43
End: 2023-03-18
Attending: PHYSICIAN ASSISTANT
Payer: COMMERCIAL

## 2023-03-18 PROCEDURE — 250N000013 HC RX MED GY IP 250 OP 250 PS 637: Performed by: PHYSICIAN ASSISTANT

## 2023-03-18 PROCEDURE — 120N000002 HC R&B MED SURG/OB UMMC

## 2023-03-18 PROCEDURE — 99231 SBSQ HOSP IP/OBS SF/LOW 25: CPT | Mod: 25 | Performed by: PSYCHIATRY & NEUROLOGY

## 2023-03-18 PROCEDURE — 250N000013 HC RX MED GY IP 250 OP 250 PS 637: Performed by: PSYCHIATRY & NEUROLOGY

## 2023-03-18 PROCEDURE — 95720 EEG PHY/QHP EA INCR W/VEEG: CPT | Performed by: PSYCHIATRY & NEUROLOGY

## 2023-03-18 PROCEDURE — 95714 VEEG EA 12-26 HR UNMNTR: CPT

## 2023-03-18 PROCEDURE — 999N000248 HC STATISTIC IV INSERT WITH US BY RN

## 2023-03-18 RX ADMIN — DILTIAZEM HYDROCHLORIDE 120 MG: 120 CAPSULE, COATED, EXTENDED RELEASE ORAL at 07:50

## 2023-03-18 RX ADMIN — LAMOTRIGINE 25 MG: 25 TABLET ORAL at 20:24

## 2023-03-18 RX ADMIN — CYCLOBENZAPRINE 10 MG: 10 TABLET, FILM COATED ORAL at 07:50

## 2023-03-18 RX ADMIN — CYCLOBENZAPRINE 10 MG: 10 TABLET, FILM COATED ORAL at 20:24

## 2023-03-18 RX ADMIN — ACETAMINOPHEN 650 MG: 325 TABLET ORAL at 01:42

## 2023-03-18 RX ADMIN — CYCLOBENZAPRINE 10 MG: 10 TABLET, FILM COATED ORAL at 14:09

## 2023-03-18 RX ADMIN — LAMOTRIGINE 25 MG: 25 TABLET ORAL at 07:50

## 2023-03-18 RX ADMIN — ARIPIPRAZOLE 2 MG: 2 TABLET ORAL at 07:50

## 2023-03-18 RX ADMIN — AMITRIPTYLINE HYDROCHLORIDE 100 MG: 25 TABLET, FILM COATED ORAL at 21:55

## 2023-03-18 ASSESSMENT — ACTIVITIES OF DAILY LIVING (ADL)
ADLS_ACUITY_SCORE: 22

## 2023-03-18 NOTE — SIGNIFICANT EVENT
"Event button pressed for RLE twitch. Pt reported this was one of the \"large\" twitches she was supposed to press button.   "

## 2023-03-18 NOTE — PLAN OF CARE
Status: Admitted for seizure monitoring  Vitals: VSS  Neuros: A/Ox4, strength 5/5, BUE tremors  IV: PIV SL   Resp/trach: RA  Diet: regular  Bowel status: LBM 3/14  : voiding spontaneously   Skin: EEG leads in place, no new deficits   Pain: mild headache, requested tylenol x1  Activity: SBA w/ GB  Plan: Continue to monitor

## 2023-03-18 NOTE — PLAN OF CARE
Status: Pt admitted for EEG monitoring. No events witnessed or reported this shift. See H&P for event types.   Vitals: VSS on RA.   Neuros: A&O x4. Denies N/T. Strengths 5/5. BUE tremors int  IV: PIV SL.   Labs/Electrolytes: WDL.  Resp/trach: LS clear. Denies SOB.   Diet: Regular.   Bowel status: BS+. LBM 3/14 PTA.   : Voids spontaneously. On menses  Skin: EEG leads in place. Edema to BLE. Blanchable redness under breasts and pannus. Abrasion to R hand.   Pain: Denies.  Activity: Standby assist and GB, seizure precautions  Plan: Capture events. Press event button for large twitches/jerks. Off Keppra.

## 2023-03-18 NOTE — PLAN OF CARE
[id number]    Status: pt admitted for EEG monitoring. No events witnessed or reported this shift  Vitals: VSS on RA  Neuros: AOx4, denies n/t, strengths 5/5, BUE tremors  IV: PIV SL  Labs/Electrolytes: WNL  Resp/trach: LSC  Diet: regular  Bowel status: BS+ LBM 3/14  : void spont  Skin: EEG leads in place. Edema to BLE. Blanchable redness under breasts and pannus. Abrasion to R hand.   Pain: denies  Activity: SBA GB  Social: pt talked to family on phone  Plan: Capture events. Press event button for large twitches/jerks. Off Keppra

## 2023-03-18 NOTE — PROGRESS NOTES
Epilepsy Service Progress Note   Interval History:   The patient had numerous extremity twitches.  She had a larger body shaking last night.  She did not have any loss of consciousness with these.    Physical Exam:   BP 97/51   Pulse 89   Temp 98.2  F (36.8  C) (Oral)   Resp 16   Wt 139.3 kg (307 lb 1.6 oz)   SpO2 98%   BMI 48.10 kg/m    Alert, awake, NAD, no aphasia or dysarthria, EOMI, face symmetric, normal finger-to-nose, motor strength 5/5.    Antiepileptic Medications Home Doses: levetiracetam 3395-0810, lamotrigine 25-25  Antiepileptic Medications Current Doses: levetiracetam discontinued, lamotrigine 25-25    Current Facility-Administered Medications   Medication     acetaminophen (TYLENOL) tablet 650 mg     albuterol (PROVENTIL HFA/VENTOLIN HFA) inhaler     amitriptyline (ELAVIL) tablet 100 mg     ARIPiprazole (ABILIFY) tablet 2 mg     bacitracin ointment     cyclobenzaprine (FLEXERIL) tablet 10 mg     diltiazem ER COATED BEADS (CARDIZEM CD/CARTIA XT) 24 hr capsule 120 mg     docusate sodium (COLACE) capsule 100 mg     lamoTRIgine (LaMICtal) tablet 25 mg     lidocaine (LMX4) cream     lidocaine (viscous) (XYLOCAINE) 2 % solution 5 mL     lidocaine 1 % 0.1-1 mL     LORazepam (ATIVAN) injection 2 mg     magnesium hydroxide (MILK OF MAGNESIA) suspension 30 mL     rizatriptan (MAXALT-MLT) ODT tab 5 mg     sodium chloride (PF) 0.9% PF flush 3 mL     sodium chloride (PF) 0.9% PF flush 3 mL     Assessment:      Estelita Patricia is a 43 year old female with a history of migraines, Luis-Parkinson-White status post ablation, documented nonepileptic spells as well as multiple psychiatric diagnoses who was admitted for vEEG to confirm all jerking events and staring events are nonepileptic and to characterize convulsions.  She had numerous extremity jerking with no abnormal EEG correlate.  She also had couple stronger whole body jerking.  These did not have and abnormal EEG correlate either.  She is off on  levetiracetam.  We will continue monitoring to capture her other spell types.    Plan:   -Continue with EEG monitoring for characterization of spells  -Seizure/fall precautions  -Lorazepam 2 mg as needed for GTC seizures      I spent 15 minutes in the care of this patient. More than 50% of time spent on counseling and coordinating care, including discussion of the EEG findings, anti-seizure medication management, and answering the patient's questions.   Sherita Navarro MD

## 2023-03-19 ENCOUNTER — APPOINTMENT (OUTPATIENT)
Dept: NEUROLOGY | Facility: CLINIC | Age: 43
End: 2023-03-19
Attending: PHYSICIAN ASSISTANT
Payer: COMMERCIAL

## 2023-03-19 PROCEDURE — 250N000013 HC RX MED GY IP 250 OP 250 PS 637: Performed by: PHYSICIAN ASSISTANT

## 2023-03-19 PROCEDURE — 95714 VEEG EA 12-26 HR UNMNTR: CPT

## 2023-03-19 PROCEDURE — 250N000013 HC RX MED GY IP 250 OP 250 PS 637: Performed by: PSYCHIATRY & NEUROLOGY

## 2023-03-19 PROCEDURE — 95720 EEG PHY/QHP EA INCR W/VEEG: CPT | Performed by: PSYCHIATRY & NEUROLOGY

## 2023-03-19 PROCEDURE — 120N000002 HC R&B MED SURG/OB UMMC

## 2023-03-19 RX ORDER — DOCUSATE SODIUM 100 MG/1
100 CAPSULE, LIQUID FILLED ORAL 2 TIMES DAILY
Status: DISCONTINUED | OUTPATIENT
Start: 2023-03-20 | End: 2023-03-23 | Stop reason: HOSPADM

## 2023-03-19 RX ORDER — POLYETHYLENE GLYCOL 3350 17 G/17G
17 POWDER, FOR SOLUTION ORAL DAILY
Status: DISCONTINUED | OUTPATIENT
Start: 2023-03-20 | End: 2023-03-23 | Stop reason: HOSPADM

## 2023-03-19 RX ADMIN — CYCLOBENZAPRINE 10 MG: 10 TABLET, FILM COATED ORAL at 19:34

## 2023-03-19 RX ADMIN — MAGNESIUM HYDROXIDE 30 ML: 400 SUSPENSION ORAL at 20:06

## 2023-03-19 RX ADMIN — LAMOTRIGINE 25 MG: 25 TABLET ORAL at 07:48

## 2023-03-19 RX ADMIN — DOCUSATE SODIUM 100 MG: 100 CAPSULE, LIQUID FILLED ORAL at 20:06

## 2023-03-19 RX ADMIN — LAMOTRIGINE 25 MG: 25 TABLET ORAL at 19:34

## 2023-03-19 RX ADMIN — CYCLOBENZAPRINE 10 MG: 10 TABLET, FILM COATED ORAL at 14:41

## 2023-03-19 RX ADMIN — AMITRIPTYLINE HYDROCHLORIDE 100 MG: 25 TABLET, FILM COATED ORAL at 21:45

## 2023-03-19 RX ADMIN — ARIPIPRAZOLE 2 MG: 2 TABLET ORAL at 07:48

## 2023-03-19 RX ADMIN — CYCLOBENZAPRINE 10 MG: 10 TABLET, FILM COATED ORAL at 07:48

## 2023-03-19 RX ADMIN — DILTIAZEM HYDROCHLORIDE 120 MG: 120 CAPSULE, COATED, EXTENDED RELEASE ORAL at 07:48

## 2023-03-19 ASSESSMENT — ACTIVITIES OF DAILY LIVING (ADL)
ADLS_ACUITY_SCORE: 22

## 2023-03-19 NOTE — PLAN OF CARE
Status: Pt admitted for EEG monitoring. No events witnessed or reported this shift. See H&P for event types.   Vitals: VSS on RA.   Neuros: A&O x4. Denies N/T. Strengths 5/5. BUE tremors int  IV: PIV SL.   Labs/Electrolytes: WDL.  Resp/trach: LS clear. Denies SOB.   Diet: Regular.   Bowel status: BS+. LBM 3/14 PTA, per pt baseline constipation  : Voids spontaneously. On menses  Skin: EEG leads in place. Edema to BLE. Blanchable redness under breasts and pannus. Abrasion to R hand.   Pain: Baseline muscle/joint pain in LLE  Activity: Standby assist and GB, seizure precautions  Plan: Capture events. Press event button for large twitches/jerks. Off Keppra.

## 2023-03-19 NOTE — PLAN OF CARE
Status: Pt admitted for EEG monitoring. No events witnessed or reported this shift.   Vitals: Tachycardic intermittently, OVSS on RA  Neuros: A&Ox4. Denies N/T. Strengths 5/5. BUE tremors   IV: PIV SL   Resp/trach: Denies SOB  Diet: Regular  Bowel status: LBM 3/14. Pt states she goes ~1x/week at baseline. Passing flatus.  : Voiding in the BR. Has menses.  Skin: Edema to BLE. Blanchable redness under breasts and pannus. Abrasion to R hand.   Pain: Denies  Activity: SBA/GB  Plan: Press event button for large twitches/jerks. Off Keppra.

## 2023-03-19 NOTE — PROGRESS NOTES
Epilepsy Service Progress Note   Interval History:   The patient continues to have frequent extremity jerking.  She did not have her convulsive events or staring spells.    Physical Exam:   /70 (BP Location: Left arm)   Pulse 101   Temp 98.4  F (36.9  C) (Oral)   Resp 18   Wt 139.3 kg (307 lb 1.6 oz)   SpO2 97%   BMI 48.10 kg/m    Alert, awake, NAD, no aphasia or dysarthria, EOMI, face symmetric, no focal weakness or tremors are noted.     Antiepileptic Medications Home Doses: levetiracetam 5945-5632, lamotrigine 25-25  Antiepileptic Medications Current Doses: levetiracetam discontinued, lamotrigine 25-25       Current Facility-Administered Medications   Medication     acetaminophen (TYLENOL) tablet 650 mg     albuterol (PROVENTIL HFA/VENTOLIN HFA) inhaler     amitriptyline (ELAVIL) tablet 100 mg     ARIPiprazole (ABILIFY) tablet 2 mg     bacitracin ointment     cyclobenzaprine (FLEXERIL) tablet 10 mg     diltiazem ER COATED BEADS (CARDIZEM CD/CARTIA XT) 24 hr capsule 120 mg     docusate sodium (COLACE) capsule 100 mg     lamoTRIgine (LaMICtal) tablet 25 mg     lidocaine (LMX4) cream     lidocaine (viscous) (XYLOCAINE) 2 % solution 5 mL     lidocaine 1 % 0.1-1 mL     LORazepam (ATIVAN) injection 2 mg     magnesium hydroxide (MILK OF MAGNESIA) suspension 30 mL     rizatriptan (MAXALT-MLT) ODT tab 5 mg     sodium chloride (PF) 0.9% PF flush 3 mL     sodium chloride (PF) 0.9% PF flush 3 mL     Assessment:      Estelita Patricia is a 43 year old female with a history of migraines, Luis-Parkinson-White status post ablation, documented nonepileptic spells as well as multiple psychiatric diagnoses who was admitted for vEEG to confirm all jerking events and staring events are nonepileptic and to characterize convulsions.  She continues to have frequent extremity jerking with no abnormal EEG correlate.  She stopped pushing the event button.  She is off  levetiracetam.  We will continue monitoring to capture  her staring spells and convulsive events.     Plan:   -Continue with EEG monitoring for characterization of spells  -Seizure/fall precautions  -Lorazepam 2 mg as needed for GTC seizures    I spent 10 minutes in the care of this patient. More than 50% of time spent on  counseling and coordinating care, including discussion of the EEG findings, anti-seizure medication management, and answering the patient's questions.   Sherita Navarro MD

## 2023-03-20 ENCOUNTER — APPOINTMENT (OUTPATIENT)
Dept: NEUROLOGY | Facility: CLINIC | Age: 43
End: 2023-03-20
Attending: PHYSICIAN ASSISTANT
Payer: COMMERCIAL

## 2023-03-20 PROCEDURE — 250N000013 HC RX MED GY IP 250 OP 250 PS 637: Performed by: PHYSICIAN ASSISTANT

## 2023-03-20 PROCEDURE — 95720 EEG PHY/QHP EA INCR W/VEEG: CPT | Mod: GC | Performed by: PSYCHIATRY & NEUROLOGY

## 2023-03-20 PROCEDURE — 120N000002 HC R&B MED SURG/OB UMMC

## 2023-03-20 PROCEDURE — 250N000013 HC RX MED GY IP 250 OP 250 PS 637: Performed by: PSYCHIATRY & NEUROLOGY

## 2023-03-20 PROCEDURE — 99231 SBSQ HOSP IP/OBS SF/LOW 25: CPT | Mod: 25 | Performed by: PHYSICIAN ASSISTANT

## 2023-03-20 PROCEDURE — 95714 VEEG EA 12-26 HR UNMNTR: CPT

## 2023-03-20 RX ADMIN — CYCLOBENZAPRINE 10 MG: 10 TABLET, FILM COATED ORAL at 15:11

## 2023-03-20 RX ADMIN — CYCLOBENZAPRINE 10 MG: 10 TABLET, FILM COATED ORAL at 07:59

## 2023-03-20 RX ADMIN — ACETAMINOPHEN 650 MG: 325 TABLET ORAL at 15:11

## 2023-03-20 RX ADMIN — AMITRIPTYLINE HYDROCHLORIDE 100 MG: 25 TABLET, FILM COATED ORAL at 21:35

## 2023-03-20 RX ADMIN — CYCLOBENZAPRINE 10 MG: 10 TABLET, FILM COATED ORAL at 19:33

## 2023-03-20 RX ADMIN — LAMOTRIGINE 25 MG: 25 TABLET ORAL at 08:00

## 2023-03-20 RX ADMIN — DILTIAZEM HYDROCHLORIDE 120 MG: 120 CAPSULE, COATED, EXTENDED RELEASE ORAL at 08:00

## 2023-03-20 RX ADMIN — ACETAMINOPHEN 650 MG: 325 TABLET ORAL at 07:59

## 2023-03-20 RX ADMIN — ARIPIPRAZOLE 2 MG: 2 TABLET ORAL at 08:03

## 2023-03-20 RX ADMIN — LAMOTRIGINE 25 MG: 25 TABLET ORAL at 19:33

## 2023-03-20 ASSESSMENT — ACTIVITIES OF DAILY LIVING (ADL)
ADLS_ACUITY_SCORE: 22

## 2023-03-20 NOTE — PLAN OF CARE
Status: Pt admitted for vEEG monitoring  Vitals: VSS on RA.   Neuros: A&O x4. Denies N/T. Strengths 5/5. BUE tremors   IV: R PIV SL  Labs/Electrolytes: WDL  Resp/trach: LS clear. Denies SOB.   Diet: Regular  Bowel status: Last BM 03/20, Blood in stool per report, Pt reported she has IBS and hemorrhoids and bleed when having BM. Refused AM bowel meds  : Voids spontaneously  Skin: Mild edema to BLE. Blanchable redness under breasts and pannus, provided interdry-cloth. Abrasion to R hand, AIDE  Pain: Baseline muscle/joint pain in LLE, managed with tylenol  Activity: SBA, GB, and seizure precautions  Plan: Off Levetiracetam, lamotrigine 25-25. Press event button for large twitches/jerks. Cont with current POC

## 2023-03-20 NOTE — PROGRESS NOTES
[facility], [place]   Epilepsy Service Daily Note      Interval History:   Continues with jerks. She reports 2 big whole body jerks this morning. She feels these could have caused her to fall. No staring spells or convulsions.     Review of System:   No nausea, No vomiting, no headaches, no dizziness, no chest pain.    Medications:   Antiepileptic Medications Home Doses: levetiracetam [id number], lamotrigine 25-25  Antiepileptic Medications Current Doses: levetiracetam dc'd, lamotrigine 25-25    Exam: Blood pressure 136/78, pulse 101, temperature 98.1  F (36.7  C), temperature source Oral, resp. rate 18, weight 139.3 kg (307 lb 1.6 oz), SpO2 99 %, not currently breastfeeding.   General: NAD  Head: NC/AT  Extremities: no LE edema   Neuro: Alert and oriented. Speech fluent. Hearing intact to normal conversation. Pupils equal, round and reactive to light. EOM's intact. Face symmetric, tongue midline. Strong shoulder shrug bilaterally. Strength 5/5 bilaterally. No drift or pronation. Intact FNF. Sensation grossly intact to light touch    EEG: no electrographic seizures     Assessment and Plan: patient seen and discussed with Dr. Parrish   Patient is a 43 year old female with a history of migraines, Luis-Parkinson-White status post ablation, documented nonepileptic spells as well as multiple psychiatric diagnoses who is being admitted for vEEG to confirm all jerking events and staring events are nonepileptic and to characterize convulsions. Bigger jerking events recorded and did not show electrographic seizures. Staring events and convulsions not yet recorded.     -continue vEEG monitoring  -continue off levetiracetam   -hygiene today  -photic stimulation       Kathy Alvarez PA-C    Type of target event identified: event with no loss of awareness, staring spell with altered awareness and convulsion   Number of events: more needed  Discharge medication plan: To be decided  Further Imaging  studies needed prior to discharge: No imaging required prior to discharge  Discharge transportation: not discussed  Other pertinent issues/goals for discharge: possibly psychiatry consult       Total time: 25 minute was spent in the care of this patient. The patient agrees with the above mentioned plan of care. I answered all the patient's questions and addressed immediate concerns. More than 50% of time spent consisted of counseling and coordinating care, including discussion of the diagnostic significance of EEG findings, anti-seizure medication management, and planning for discharge home

## 2023-03-20 NOTE — PLAN OF CARE
Status: Pt admitted for EEG monitoring. Large body twitching noted when ambulating to BR ~0650, event pushed. See H&P for event types.   Vitals: VSS on RA.   Neuros: A&Ox4. Denies N/T. Strengths 5/5. BUE tremors   IV: PIV SL.   Labs/Electrolytes: WDL.  Resp/trach: LS clear. Denies SOB.   Diet: Regular.   Bowel status: Large BM with blood 3/19 after bowel meds given  : Voids spontaneously. On menses.  Skin: EEG leads in place. Edema to BLE. Blanchable redness under breasts and pannus. Abrasion to R hand.   Pain: Baseline muscle/joint pain in LLE  Activity: Standby assist and GB, seizure precautions  Plan: Capture events. Press event button for large twitches/jerks. Off Keppra

## 2023-03-21 ENCOUNTER — APPOINTMENT (OUTPATIENT)
Dept: NEUROLOGY | Facility: CLINIC | Age: 43
End: 2023-03-21
Attending: PHYSICIAN ASSISTANT
Payer: COMMERCIAL

## 2023-03-21 LAB
ERYTHROCYTE [DISTWIDTH] IN BLOOD BY AUTOMATED COUNT: 12.7 % (ref 10–15)
HCT VFR BLD AUTO: 36.7 % (ref 35–47)
HGB BLD-MCNC: 11.8 G/DL (ref 11.7–15.7)
MCH RBC QN AUTO: 29.6 PG (ref 26.5–33)
MCHC RBC AUTO-ENTMCNC: 32.2 G/DL (ref 31.5–36.5)
MCV RBC AUTO: 92 FL (ref 78–100)
PLATELET # BLD AUTO: 235 10E3/UL (ref 150–450)
RBC # BLD AUTO: 3.99 10E6/UL (ref 3.8–5.2)
WBC # BLD AUTO: 7.7 10E3/UL (ref 4–11)

## 2023-03-21 PROCEDURE — 250N000013 HC RX MED GY IP 250 OP 250 PS 637: Performed by: STUDENT IN AN ORGANIZED HEALTH CARE EDUCATION/TRAINING PROGRAM

## 2023-03-21 PROCEDURE — 95720 EEG PHY/QHP EA INCR W/VEEG: CPT | Mod: GC | Performed by: PSYCHIATRY & NEUROLOGY

## 2023-03-21 PROCEDURE — 250N000013 HC RX MED GY IP 250 OP 250 PS 637: Performed by: PSYCHIATRY & NEUROLOGY

## 2023-03-21 PROCEDURE — 85014 HEMATOCRIT: CPT | Performed by: PSYCHIATRY & NEUROLOGY

## 2023-03-21 PROCEDURE — 120N000002 HC R&B MED SURG/OB UMMC

## 2023-03-21 PROCEDURE — 99232 SBSQ HOSP IP/OBS MODERATE 35: CPT | Mod: 25 | Performed by: PHYSICIAN ASSISTANT

## 2023-03-21 PROCEDURE — 250N000013 HC RX MED GY IP 250 OP 250 PS 637: Performed by: PHYSICIAN ASSISTANT

## 2023-03-21 PROCEDURE — 36415 COLL VENOUS BLD VENIPUNCTURE: CPT | Performed by: PSYCHIATRY & NEUROLOGY

## 2023-03-21 PROCEDURE — 95714 VEEG EA 12-26 HR UNMNTR: CPT

## 2023-03-21 RX ADMIN — CYCLOBENZAPRINE 10 MG: 10 TABLET, FILM COATED ORAL at 08:14

## 2023-03-21 RX ADMIN — LAMOTRIGINE 25 MG: 25 TABLET ORAL at 20:07

## 2023-03-21 RX ADMIN — DOCUSATE SODIUM 100 MG: 100 CAPSULE, LIQUID FILLED ORAL at 08:14

## 2023-03-21 RX ADMIN — DILTIAZEM HYDROCHLORIDE 120 MG: 120 CAPSULE, COATED, EXTENDED RELEASE ORAL at 08:15

## 2023-03-21 RX ADMIN — LAMOTRIGINE 25 MG: 25 TABLET ORAL at 08:15

## 2023-03-21 RX ADMIN — CYCLOBENZAPRINE 10 MG: 10 TABLET, FILM COATED ORAL at 14:53

## 2023-03-21 RX ADMIN — AMITRIPTYLINE HYDROCHLORIDE 100 MG: 25 TABLET, FILM COATED ORAL at 21:55

## 2023-03-21 RX ADMIN — CYCLOBENZAPRINE 10 MG: 10 TABLET, FILM COATED ORAL at 20:07

## 2023-03-21 RX ADMIN — ARIPIPRAZOLE 2 MG: 2 TABLET ORAL at 08:15

## 2023-03-21 RX ADMIN — ACETAMINOPHEN 650 MG: 325 TABLET ORAL at 14:53

## 2023-03-21 RX ADMIN — POLYETHYLENE GLYCOL 3350 17 G: 17 POWDER, FOR SOLUTION ORAL at 08:15

## 2023-03-21 ASSESSMENT — ACTIVITIES OF DAILY LIVING (ADL)
ADLS_ACUITY_SCORE: 22

## 2023-03-21 NOTE — PROGRESS NOTES
[facility], [place]   Epilepsy Service Daily Note      Interval History:   Continues with jerks. She reports 3 bigger jerks overnight for which she pushed event button. She reports didn't sleep well as she is feeling anxious/nervous about having a seizure.     Review of System:   No nausea, No vomiting, no headaches, no dizziness, no chest pain.    Medications:   Antiepileptic Medications Home Doses: levetiracetam [id number], lamotrigine 25-25  Antiepileptic Medications Current Doses: levetiracetam dc'd, lamotrigine 25-25    Exam: Blood pressure 119/71, pulse 95, temperature 98  F (36.7  C), temperature source Oral, resp. rate 20, weight 139.3 kg (307 lb 1.6 oz), SpO2 99 %, not currently breastfeeding.   General: NAD  Head: NC/AT  Extremities: no LE edema   Neuro: Alert and oriented. Speech fluent. Hearing intact to normal conversation. Pupils equal, round and reactive to light. EOM's intact. Face symmetric, tongue midline. Strong shoulder shrug bilaterally. Strength 5/5 bilaterally. No drift or pronation. Intact FNF. Sensation grossly intact to light touch    EEG: generalized spike-wave discharges     Assessment and Plan: patient seen and discussed with Dr. Parrish   1. Patient is a 43 year old female with a history of migraines, Luis-Parkinson-White status post ablation, documented nonepileptic spells as well as multiple psychiatric diagnoses who is being admitted for vEEG to confirm all jerking events and staring events are nonepileptic and to characterize convulsions. Multiple jerks recorded and 2 of the bigger ones DID show seizure discharge. EEG is now showing generalized epileptiform activity. We discussed with patient interictal EEG abnormalities showing a seizure tendency as well. We also reviewed that some jerks are likely not epileptic and some are.     -continue vEEG monitoring  -continue off levetiracetam   -will plan for discharge Thursday       Kathy Alvarez PA-C    Type  of target event identified: event with no loss of awareness, staring spell with altered awareness and convulsion   Number of events: more needed  Discharge medication plan: To be decided  Further Imaging studies needed prior to discharge: No imaging required prior to discharge  Discharge transportation: not discussed  Other pertinent issues/goals for discharge: possibly psychiatry consult       Total time: 35 minute was spent in the care of this patient. The patient agrees with the above mentioned plan of care. I answered all the patient's questions and addressed immediate concerns. More than 50% of time spent consisted of counseling and coordinating care, including discussion of the diagnostic significance of EEG findings, anti-seizure medication management, and planning for discharge home

## 2023-03-21 NOTE — PLAN OF CARE
Status: Pt admitted for vEEG monitoring.  Vitals: VSS on RA ex intermittently tachycardic up to 106. On continuous pulse ox overnight.  Neuros: Intact ex BUE tremors.  IV: PIV SL  Resp/trach: on RA. Denies SOB.   Diet: Regular, has snacks in bedside drawer.  Bowel status: BS+, no BM this shift. LBM 03/20, with blood. Hx of IBS and hemorrhoids and bleed when having BM. Declined evening bowel meds.  : Voids spontaneously  Skin: Mild edema to BLE. Blanchable redness under breasts and pannus has been utilizing interdry. Abrasion to R hand, AIDE.  Pain: Baseline muscle/joint pain in LLE, denied overnight.  Activity: SBA/GB, legs can buckle if patient has event while standing. Photic stimulation tests done prior to sleep in evening. pressed button @ 0010, 0139, 0146 for full body single jerk.  Plan: Off Levetiracetam. Continue to press event button for large twitches/jerks. Continue to monitor and follow POC.

## 2023-03-21 NOTE — PLAN OF CARE
Status: Pt admitted for vEEG monitoring  Vitals: VSS on RA.   Neuros: A&O x4. Denies N/T. Strengths 5/5. BUE tremors at times  IV: R PIV SL  Labs/Electrolytes: WDL  Resp/trach: LS clear  Diet: Regular, good appetite  Bowel status: Last BM 03/21, Blood in stool, Pt reported she has IBS and hemorrhoids and bleed when having BM, team was paged. Refused AM bowel meds  : Voids spontaneously  Skin: Mild edema to BLE. Blanchable redness under breasts and pannus, provided interdry-cloth. Abrasion to R hand, AIDE  Pain: Baseline muscle/joint pain in LLE, managed with tylenol  Activity: SBA, GB, and seizure precautions  Plan: Off Levetiracetam, lamotrigine 25-25. Press event button for large twitches/jerks. Cont with current POC

## 2023-03-21 NOTE — PROVIDER NOTIFICATION
"Team was paged    \"FYI: pt has bloody bowel movements and states she has had such BMs before. She reports having IBS and hemorrhoids.\"  "

## 2023-03-22 ENCOUNTER — APPOINTMENT (OUTPATIENT)
Dept: NEUROLOGY | Facility: CLINIC | Age: 43
End: 2023-03-22
Attending: PHYSICIAN ASSISTANT
Payer: COMMERCIAL

## 2023-03-22 PROCEDURE — 95714 VEEG EA 12-26 HR UNMNTR: CPT

## 2023-03-22 PROCEDURE — 250N000013 HC RX MED GY IP 250 OP 250 PS 637: Performed by: PHYSICIAN ASSISTANT

## 2023-03-22 PROCEDURE — 250N000013 HC RX MED GY IP 250 OP 250 PS 637: Performed by: STUDENT IN AN ORGANIZED HEALTH CARE EDUCATION/TRAINING PROGRAM

## 2023-03-22 PROCEDURE — 120N000002 HC R&B MED SURG/OB UMMC

## 2023-03-22 PROCEDURE — 95720 EEG PHY/QHP EA INCR W/VEEG: CPT | Mod: GC | Performed by: PSYCHIATRY & NEUROLOGY

## 2023-03-22 PROCEDURE — 250N000013 HC RX MED GY IP 250 OP 250 PS 637: Performed by: PSYCHIATRY & NEUROLOGY

## 2023-03-22 PROCEDURE — 99232 SBSQ HOSP IP/OBS MODERATE 35: CPT | Mod: 25 | Performed by: PHYSICIAN ASSISTANT

## 2023-03-22 RX ORDER — NYSTATIN 100000 U/G
OINTMENT TOPICAL 2 TIMES DAILY
Status: DISCONTINUED | OUTPATIENT
Start: 2023-03-22 | End: 2023-03-23 | Stop reason: HOSPADM

## 2023-03-22 RX ADMIN — LEVETIRACETAM 2000 MG: 750 TABLET, FILM COATED ORAL at 19:46

## 2023-03-22 RX ADMIN — DOCUSATE SODIUM 100 MG: 100 CAPSULE, LIQUID FILLED ORAL at 08:25

## 2023-03-22 RX ADMIN — LAMOTRIGINE 25 MG: 25 TABLET ORAL at 08:25

## 2023-03-22 RX ADMIN — DILTIAZEM HYDROCHLORIDE 120 MG: 120 CAPSULE, COATED, EXTENDED RELEASE ORAL at 08:25

## 2023-03-22 RX ADMIN — CYCLOBENZAPRINE 10 MG: 10 TABLET, FILM COATED ORAL at 08:25

## 2023-03-22 RX ADMIN — NYSTATIN: 100000 OINTMENT TOPICAL at 19:46

## 2023-03-22 RX ADMIN — CYCLOBENZAPRINE 10 MG: 10 TABLET, FILM COATED ORAL at 13:49

## 2023-03-22 RX ADMIN — AMITRIPTYLINE HYDROCHLORIDE 100 MG: 25 TABLET, FILM COATED ORAL at 22:07

## 2023-03-22 RX ADMIN — LEVETIRACETAM 2000 MG: 750 TABLET, FILM COATED ORAL at 13:49

## 2023-03-22 RX ADMIN — CYCLOBENZAPRINE 10 MG: 10 TABLET, FILM COATED ORAL at 19:46

## 2023-03-22 RX ADMIN — LAMOTRIGINE 25 MG: 25 TABLET ORAL at 19:46

## 2023-03-22 RX ADMIN — ARIPIPRAZOLE 2 MG: 2 TABLET ORAL at 08:25

## 2023-03-22 ASSESSMENT — ACTIVITIES OF DAILY LIVING (ADL)
ADLS_ACUITY_SCORE: 22

## 2023-03-22 NOTE — PLAN OF CARE
[id number]  Status: Pt admitted for VEEG monitoring  Vitals: VSS on RA except intermittent tachycardia.  Cont. Pulse ox in place.   Neuros: A&O x4. Denies N/T. Strengths 5/5. Intermittent BUE tremors, not noted this shift.   IV: R PIV SL  Labs/Electrolytes: WDL; CBC ordered in light of bloody stools; WNL.   Resp/trach: LS clear  Diet: Tolerating a reg diet w/good intake  Bowel status: Pt had loose, bloody, BM today, Pt reports hx of IBS and hemorrhoids and states she frequently has bloody stools, but this is an increase.   : Voids spont  Skin: Mild edema to BLE. Blanchable redness under breasts and pannus, provided interdry-cloth. Abrasion to R hand, AIDE  Pain: Baseline muscle/joint pain in LLE, managed with tylenol  Activity: SBA, GB, and seizure precautions  Plan: Off Levetiracetam, lamotrigine 25-25. Press event button for large twitches/jerks. Cont with current POC

## 2023-03-22 NOTE — PLAN OF CARE
VSS except intermittently tachycardic in the 100's.  Denied pain.  Neuros intact except BUE tremors.  VEEG leads in place.  Event button pushed by pt at 0408 and 0409 for back to back larger body jerks.   R PIV SL.  On a regular diet.  Voiding spontaneously.  Last BM on 3/21 was bloody; MDs aware, labs reassuring.  Up SBA and GB.  BLE edema; wearing PCDs.  Redness under skin folds.  Off PTA Keppra, remains on PTA Lamictal.  Likely discharge home Thursday.  Continue with POC.         Goal Outcome Evaluation:      Plan of Care Reviewed With: patient    Overall Patient Progress: no change

## 2023-03-22 NOTE — PLAN OF CARE
Status: Pt admitted for vEEG monitoring. No events witnessed or reported this shift   Vitals: VSS on RA except intermittent tachy to 100. Continuous pulseox on when sleeping  Neuros: AOx4, strengths 5/5, denies n/t. Intermittent BUE tremors, greater in hands, some BLE tremors at times per pt, none noted by writer  IV: PIV SL  Labs/Electrolytes: WNL  Resp/trach: LSC  Diet: regular  Bowel status: BS+, LBM 3/22, pt hx hemorrhoids, bowel meds held  : void spont  Skin: mild edema to BLE, rash below pannus  Pain: denies  Activity: SBA GB, seizure precautions  Social: pt talked on phone to family to arrange discharge ride  Plan: discharge 3/23 to home, keppra increased

## 2023-03-22 NOTE — PROGRESS NOTES
[facility], [place]   Epilepsy Service Daily Note      Interval History:   She reports a big jerk overnight around 0400 and again during photic stimulation tonight. No staring spells she is aware of.     Review of System:   No nausea, No vomiting, no headaches, no dizziness, no chest pain. Had a bloody BM yesterday. She reports this happens at home as well.  She attributes to her IBS/Crohns and hemorrhoids. She has not followed up with GI, but has upcoming appointment with PCP and will discuss with her. She no longer has her menstrual cycle. CBC checked last night and hemoglobin 11.8 and on 2/21/23 it was 12.7.    Medications:   Antiepileptic Medications Home Doses: levetiracetam [id number], lamotrigine 25-25  Antiepileptic Medications Current Doses: levetiracetam dc'd, lamotrigine 25-25    Exam: Blood pressure (P) 128/66, pulse (P) 97, temperature (P) 98.2  F (36.8  C), temperature source (P) Oral, resp. rate (P) 16, weight 139.3 kg (307 lb 1.6 oz), SpO2 (P) 98 %, not currently breastfeeding.   General: NAD  Head: NC/AT  Extremities: no LE edema   Neuro: Alert and oriented. Speech fluent. Hearing intact to normal conversation. Pupils equal, round and reactive to light. EOM's intact. Face symmetric, tongue midline. Strong shoulder shrug bilaterally. Strength 5/5 bilaterally. No drift or pronation. Intact FNF. Sensation grossly intact to light touch    EEG: generalized spike-wave discharges     Assessment and Plan: patient seen and discussed with Dr. Parrish   1. Patient is a 43 year old female with a history of migraines, Luis-Parkinson-White status post ablation, documented nonepileptic spells as well as multiple psychiatric diagnoses who is being admitted for vEEG to confirm all jerking events and staring events are nonepileptic and to characterize convulsions. Multiple jerks recorded and some of the bigger ones DID show seizure discharge. EEG is now showing generalized epileptiform  activity as well. We discussed with patient interictal EEG abnormalities showing a seizure tendency as well. We also reviewed that some jerks are likely not epileptic and some are.     -continue vEEG monitoring  -restart levetiracetam at [id number]  -will plan for discharge tomorrow AM      Kathy Alvarez PA-C    Type of target event identified: event with no loss of awareness, staring spell with altered awareness and convulsion   Number of events: more needed  Discharge medication plan: To be decided  Further Imaging studies needed prior to discharge: No imaging required prior to discharge  Discharge transportation: not discussed  Other pertinent issues/goals for discharge: possibly psychiatry consult       Total time: 35 minute was spent in the care of this patient. The patient agrees with the above mentioned plan of care. I answered all the patient's questions and addressed immediate concerns. More than 50% of time spent consisted of counseling and coordinating care, including discussion of the diagnostic significance of EEG findings, anti-seizure medication management, and planning for discharge home

## 2023-03-22 NOTE — PLAN OF CARE
Status: Pt admitted for vEEG monitoring   Vitals: VSS on RA. Continuous pulse ox when sleeping   Neuros: A&Ox4. Denied n/t and HA. Strengths 5/5. Intermittent BUE tremors, some LUE tremors noted this shift.   IV: PIV SL   Labs/Electrolytes: WDL   Resp/trach: LSC   Diet: Regular diet, adequate intake   Bowel status: LBM this morning 3/22/23. Loose, bloody stool. Pt states history of hemorrhoids. Bowel meds held.   : Voiding spontaneously   Skin: Mild edema to BLE. Rash below pannus, pt requesting nystatin powder. On charge board to page   Pain: Denied this shift   Activity: SBA with GB. Siezure precautions.   Social: No visitors this shift   Plan: Plan to discharge tomorrow am. No events occurred this shift. Keppra restarted this afternoon

## 2023-03-23 ENCOUNTER — APPOINTMENT (OUTPATIENT)
Dept: NEUROLOGY | Facility: CLINIC | Age: 43
End: 2023-03-23
Attending: PHYSICIAN ASSISTANT
Payer: COMMERCIAL

## 2023-03-23 VITALS
OXYGEN SATURATION: 99 % | HEART RATE: 89 BPM | RESPIRATION RATE: 16 BRPM | WEIGHT: 293 LBS | TEMPERATURE: 98 F | DIASTOLIC BLOOD PRESSURE: 65 MMHG | SYSTOLIC BLOOD PRESSURE: 116 MMHG | BODY MASS INDEX: 48.1 KG/M2

## 2023-03-23 PROCEDURE — 250N000013 HC RX MED GY IP 250 OP 250 PS 637: Performed by: PHYSICIAN ASSISTANT

## 2023-03-23 PROCEDURE — 95711 VEEG 2-12 HR UNMONITORED: CPT

## 2023-03-23 PROCEDURE — 95718 EEG PHYS/QHP 2-12 HR W/VEEG: CPT | Mod: GC | Performed by: PSYCHIATRY & NEUROLOGY

## 2023-03-23 PROCEDURE — 250N000013 HC RX MED GY IP 250 OP 250 PS 637: Performed by: PSYCHIATRY & NEUROLOGY

## 2023-03-23 PROCEDURE — 99238 HOSP IP/OBS DSCHRG MGMT 30/<: CPT | Mod: 25 | Performed by: PHYSICIAN ASSISTANT

## 2023-03-23 RX ORDER — LEVETIRACETAM 1000 MG/1
2000 TABLET ORAL 2 TIMES DAILY
Qty: 120 TABLET | Refills: 2 | Status: SHIPPED | OUTPATIENT
Start: 2023-03-23 | End: 2023-05-23

## 2023-03-23 RX ORDER — NYSTATIN 100000 U/G
OINTMENT TOPICAL 2 TIMES DAILY
Qty: 15 G | Refills: 0 | Status: SHIPPED | OUTPATIENT
Start: 2023-03-23

## 2023-03-23 RX ADMIN — CYCLOBENZAPRINE 10 MG: 10 TABLET, FILM COATED ORAL at 08:04

## 2023-03-23 RX ADMIN — LAMOTRIGINE 25 MG: 25 TABLET ORAL at 08:04

## 2023-03-23 RX ADMIN — ARIPIPRAZOLE 2 MG: 2 TABLET ORAL at 08:04

## 2023-03-23 RX ADMIN — DILTIAZEM HYDROCHLORIDE 120 MG: 120 CAPSULE, COATED, EXTENDED RELEASE ORAL at 08:04

## 2023-03-23 RX ADMIN — ACETAMINOPHEN 650 MG: 325 TABLET ORAL at 00:41

## 2023-03-23 RX ADMIN — LEVETIRACETAM 2000 MG: 750 TABLET, FILM COATED ORAL at 08:04

## 2023-03-23 ASSESSMENT — ACTIVITIES OF DAILY LIVING (ADL)
ADLS_ACUITY_SCORE: 22

## 2023-03-23 NOTE — PLAN OF CARE
Status: Pt admitted for EEG monitoring, no events witnessed or reported overnight  Vitals: VSS onRA  Neuros: A&O x4, strengths 5/5, no N/T, intermittent tremors  IV: PIV is SL  Labs/Electrolytes: WDL  Resp/trach: Lung sounds are clear  Diet: Reg, tolerating  Bowel status: Bowel sounds are active, Last BM 3/22  : Voiding  Skin: Rash to pannus  Pain: Complained of some tooth pain that was turning into a HA, found relief w/ PRN Tylenol  Activity: SBA w/ GB  Social: Pt was calm and cooperative overnight  Plan: Likely discharge today (3/23)  Updates this shift: Pt seemed to sleep well overnight

## 2023-03-23 NOTE — DISCHARGE SUMMARY
[facility]  EPILEPSY SERVICE DISCHARGE SUMMARY    Patient Name:  Estelita Patricia  MRN:  [id number]      :  1980      Date of Admission:  3/16/2023  Date of Discharge::  3/23/2023 10:15 AM  Admitting Physician:  Blaise Parrish MD  Discharge Physician:  KERRIE Mane,   Primary Care Provider:   Ailyn Crouch  Discharge Disposition:   Discharged to home    Admission Diagnoses:  1. Seizures  2. Nonepileptic spells, jerking events and staring events; demonstrated to be nonepileptic on 2 previous evaluations  3. Luis-Parkinson-White, status post ablation   4. History of Nephrolithiasis x3-4.  5. Status post total knee replacement on the left; chronic knee pain since  6. Chronic daily headache  7. Multiple psychiatric diagnoses; PTSD, anxiety, depression, bipolar and borderline personality  8. Lupus, apparently was told she had this when younger  9. IBS  10. Crohn's  11. Tachycardia, supraventricular tachycardia     Discharge Diagnoses:    1. Generalized epilepsy  2. Nonepileptic spells, jerks   3. Intertrigo   4. Luis-Parkinson-White, status post ablation   4. History of Nephrolithiasis x3-4.  Chronic daily headache  5. Multiple psychiatric diagnoses; PTSD, anxiety, depression, bipolar and borderline personality  6. Lupus, apparently was told she had this when younger  7. IBS  8. Crohn's    Brief History of Illness:   Patient is a 43 year old female with a history of migraines, Luis-Parkinson-White status post ablation, documented nonepileptic spells as well as multiple psychiatric diagnoses who is being admitted for vEEG to confirm all jerking events and staring events are nonepileptic and to characterize convulsions.She reports first convulsion occurred at age 14 in the setting of a small perforation after a colonoscopy with fever and hospitalization. She was not started on antiepileptic drugs at that time. Her jerking started around age  17 and staring spells at 28. In her early 30's she had her second convulsion and levetiracetam was started. Convulsions were controlled for 7-8 years until 12/2022 but her jerking and staring events continued. In 12/2022 she had 3 convulsions and divalproex was added, however this caused weight gain and was stopped.      She describes spells as follows:  Type 1: Jerks of arms or legs. Can be unilateral or bilateral. Has caused her to fall and drop things. They don't occur any particular time of day. These happen 10 plus times a day. These are the most troublesome for her.      Type 2: These are staring spells. She reports can hear but can't speak. No automatisms have been noted. After she is tired. She is not always aware if one has occurred. These happen daily.     Type 3: Convulsive spells. She has no warning or jerks immediately preceding convulsion. She gets stiff and has jerking. Her father reported at her last visit that eyes are closed and stiffening and jerking happen on and off. She bites her tongue (has a photo with swollen tongue). No incontinence. After she is confused, tired sore and has a headache. She had 3 in December, 2-3 in january and 2-3 in February. Her last one was 3 weeks ago.       Jerking events and staring events have been recorded on EEG on 2 previous evaluations and were nonepileptic. Her convulsions have never been recorded.        Hospital course:  She was tapered off levetiracetam this admission. Multiple jerks recorded and some of the bigger ones DID show seizure discharge and many others had NO seizure discharge. Interictal EEG did show generalized epileptiform activity. It was discussed with patient interictal EEG abnormalities showing a seizure tendency. We also reviewed that some jerks are likely not epileptic and some are epileptic.     No convulsions or staring spells recorded this admission.     Prior to discharge her levetiracetam was restarted and increased to 9805-8002. She  had no problems tolerating this.     During this admission patient also had bloody BM's. She reports this is not new and was happening at home. She attributes to hemorrhoids and crohn's.  A CBC was done and her hemoglobin was normal. She was told to f/u with her PCP.     She also noted some redness on her abdomenal pannus. At home she occasionally uses a barrier cream as well as Nystatin. She requested nystatin here and for discharge.     Video EEG Monitoring:   SUMMARY OF 8 DAYS OF VIDEO EEG MONITORING:  On days 1-3 of monitoring, video EEG demonstrated normal background activity without epileptiform discharges or seizures.  Patient reported occasional low amplitude jerks that were not associated with EEG changes.  On day 4, after complete discontinuation of levetiracetam, generalized spike-wave discharges were seen most frequently during drowsiness without clinical correlate.  On day 5, a pair of generalized spike discharges were seen in conjunction with a myoclonic jerk.  On day 6, generalized spike wave discharges as well as generalized polyspike wave discharges were seen. More than 20 events of myoclonic jerking movements corresponding with these spike/polyspike wave discharges were seen, consistent with myoclonic seizure.  On day 7, generalized spike/polyspike wave discharges continued.  In addition, photic stimulation was associated with increased persistence of epileptiform activity and with overt myoclonic seizures.  Spells of staring and unresponsiveness were not recorded.  Generalized tonic-clonic seizures were not recorded.  After restarting levetiracetam on day 7, there was a decrease in frequency of generalized spike/polyspike wave discharges.  On day 8, after receiving more than two levetiracetam doses, there was no longer evidence of generalized epileptiform discharges nor electrographic seizures.  Myoclonic seizures ceased following readministration of levetiracetam.     Overall, findings indicate  "that  1) lower amplitude jerks (what the patient labeled as \"small seizures\") are not associated with epileptiform discharges and are not myoclonic seizures.  These may have a psychogenic etiology.  2) larger amplitude jerks (what the patient labeled as \"large  seizures\") are myoclonic seizures.  3) the generalized epileptiform discharges and myoclonic seizures recorded indicate a diagnosis of generalized epilepsy with myoclonic seizures.The photoparoxysmal response suggests that she may have photosensitive epilepsy.  The appearance of epileptiform abnormalities and electrographic seizure activity after discontinuation of levetiracetam and the resolution of such activity after resumption of levetiracetam strongly suggests that levetiracetam is effective in suppressing seizure tendency in this patient.  4) patient appeared to be able to distinguish between \"small seizures\" and \"large seizures\" during clinical interactions on daily rounds.    Consultations:    None     Discharge Medications:    Current Discharge Medication List      START taking these medications    Details   nystatin (MYCOSTATIN) 870322 UNIT/GM external ointment Apply topically 2 times daily Apply to rash on stomach  Qty: 15 g, Refills: 0    Associated Diagnoses: Intertrigo         CONTINUE these medications which have CHANGED    Details   levETIRAcetam (KEPPRA) 1000 MG tablet Take 2 tablets (2,000 mg) by mouth 2 times daily  Qty: 120 tablet, Refills: 2    Associated Diagnoses: Generalized epilepsy (H)         CONTINUE these medications which have NOT CHANGED    Details   ACETAMINOPHEN PO Take 1,000 mg by mouth At Bedtime      amitriptyline (ELAVIL) 25 MG tablet Take 100 mg by mouth At Bedtime      ARIPiprazole (ABILIFY) 2 MG tablet Take 2 mg by mouth daily      cyclobenzaprine (FLEXERIL) 10 MG tablet Take 10 mg by mouth 3 times daily      diltiazem ER (DILT-XR) 120 MG 24 hr capsule Take 120 mg by mouth daily      lamoTRIgine (LAMICTAL) 25 MG tablet " Take 25 mg by mouth 2 times daily      LORazepam (ATIVAN) 1 MG tablet Take 1 mg by mouth daily as needed      rizatriptan (MAXALT-MLT) 5 MG ODT tab Take 1 tablet by mouth at onset of headache Can repeat once in 2 hours if needed      albuterol (PROAIR HFA/PROVENTIL HFA/VENTOLIN HFA) 108 (90 Base) MCG/ACT Inhaler Inhale 1-2 puffs into the lungs as needed for shortness of breath or wheezing      medical cannabis liquid Take 0.5 mLs by mouth 2 times daily [employer]             Discharge physical examination:   Vitals:  B/P: 116/65, T: 98, P: 89, R: 16  General:  Adult, in NAD, cooperative  HEENT:  NC/AT  Cardiac:  RRR, no m/r/g  Chest:  CTAB  Abdomen:  S/NT/ND, mild redness abdominal pannus, no open lesions   Extremities:  No LE swelling.    Skin:  No rash or lesion.    Psych:  Mood pleasant, affect congruent  Neuro: Alert and oriented. Speech fluent. Hearing intact to normal conversation. Pupils equal, round and reactive to light. EOM's intact. Face symmetric, tongue midline. Strong shoulder shrug bilaterally. Strength 5/5 bilaterally. No drift or pronation. Intact FNF. Sensation grossly intact to light touch.Gait normal, no unsteadiness.     Discharge follow up and instructions:    1.  Diet:   As prior to admission   2.  Activity: State laws prohibit operating a motor vehicle following any seizure or other episode with loss of awareness, or inability to sit up (Varies by state, be aware and comply with the regulations applicable to you). State law may require the licensed  to report any future episode to the DMV . Avoid any activities that might lead to self-injury or injury of others following any event with impaired awareness or impaired motor control. Such activities include but are not limited to holding babies or young children at heights from which they might be injured if dropped, bathing infants or young children in situations in which they might drown without continuous interactive care by an adult  who is fully capable at all times during the bath, operating power cutting or other tools, handling firearms, exposure to heights from which you might fall, exposure to vessels with hot cooking oil or water, and swimming alone.  3.  Follow up:  Nurse call in 2-3 days. Follow-up video visit with Dr. Parrish in 4-6 weeks. She was instructed to follow up with PCP (she has upcoming appointment in few days) regarding bloody stools and intertrigo.      KERRIE Mane    Total time: 25 minutes was spent in the care of this patient. The patient agrees with the above mentioned plan of care. I answered all the patient's questions and addressed immediate concerns. More than 50% of time spent consisted of counseling and coordinating care, including discussion of the diagnostic significance of EEG findings, anti-seizure medication management, and planning for discharge home

## 2023-03-23 NOTE — PLAN OF CARE
Vitals: VSS   Neuros: Intact - Int tremors   IV: PIV removed   Resp/trach: WNL  Diet: Reg  Bowel status: BM 3/22  : Voiding  Skin: Rash to pannus  Pain: Denies  Activity: SBA w/ GB  Social: NA  Plan: Pt is discharging home at this time. Discharge instructions and medications gone over and questions answered. Pt declined need for hospital transportation. RN walked pt to front door where dad will pick her up.

## 2023-03-27 ENCOUNTER — VIRTUAL VISIT (OUTPATIENT)
Dept: NEUROLOGY | Facility: CLINIC | Age: 43
End: 2023-03-27
Payer: COMMERCIAL

## 2023-03-27 DIAGNOSIS — Z53.9 NO SHOW: Primary | ICD-10-CM

## 2023-03-27 NOTE — PROGRESS NOTES
"  Recent discharge from inpatient monitoring  Information from the discharge summary (pending finalization):-    \"[facility]  EPILEPSY SERVICE DISCHARGE SUMMARY     Patient Name:  Estelita Patricia  MRN:  [id number]      :  1980        Date of Admission:               3/16/2023  Date of Discharge::              3/23/2023 10:15 AM  Admitting Physician:             Blaise Parrish MD  Discharge Physician:             KERRIE Mane,   Primary Care Provider:         Ailyn Crouch  Discharge Disposition:         Discharged to home     Admission Diagnoses:  1. Seizures  2. Nonepileptic spells, jerking events and staring events; demonstrated to be nonepileptic on 2 previous evaluations  3. Luis-Parkinson-White, status post ablation   4. History of Nephrolithiasis x3-4.  5. Status post total knee replacement on the left; chronic knee pain since  6. Chronic daily headache  7. Multiple psychiatric diagnoses; PTSD, anxiety, depression, bipolar and borderline personality  8. Lupus, apparently was told she had this when younger  9. IBS  10. Crohn's  11. Tachycardia, supraventricular tachycardia      Discharge Diagnoses:    1. Generalized epilepsy  2. Nonepileptic spells, jerks   3. Intertrigo   4. Luis-Parkinson-White, status post ablation   4. History of Nephrolithiasis x3-4.  Chronic daily headache  5. Multiple psychiatric diagnoses; PTSD, anxiety, depression, bipolar and borderline personality  6. Lupus, apparently was told she had this when younger  7. IBS  8. Crohn's  ---  Hospital course:  She was tapered off levetiracetam this admission. Multiple jerks recorded and some of the bigger ones DID show seizure discharge and many others had NO seizure discharge. Interictal EEG did show generalized epileptiform activity. It was discussed with patient interictal EEG abnormalities showing a seizure tendency. We also reviewed that some jerks are likely " "not epileptic and some are epileptic.      No convulsions or staring spells recorded this admission.      Prior to discharge her levetiracetam was restarted and increased to 1620-5620. She had no problems tolerating this.      During this admission patient also had bloody BM's. She reports this is not new and was happening at home. She attributes to hemorrhoids and crohn's.  A CBC was done and her hemoglobin was normal.      She also noted some redness on her abdomenal pannus. At home she occasionally uses a barrier cream as well as Nystatin. She requested nystatin here and for discharge\"          Future Appointments   Date Time Provider Department Center   5/23/2023  9:30 AM in clinic Blaise Parrish MD [facility]            09.25, call placed, message left with call back number      "

## 2023-04-30 ENCOUNTER — HEALTH MAINTENANCE LETTER (OUTPATIENT)
Age: 43
End: 2023-04-30

## 2023-05-01 NOTE — PROGRESS NOTES
No call returned to nursing, however patient had a call with the provider.  Will close this encounter

## 2023-05-23 ENCOUNTER — VIRTUAL VISIT (OUTPATIENT)
Dept: NEUROLOGY | Facility: CLINIC | Age: 43
End: 2023-05-23
Payer: COMMERCIAL

## 2023-05-23 VITALS — BODY MASS INDEX: 45.99 KG/M2 | HEIGHT: 67 IN | WEIGHT: 293 LBS

## 2023-05-23 DIAGNOSIS — G40.309 GENERALIZED EPILEPSY (H): ICD-10-CM

## 2023-05-23 DIAGNOSIS — G40.309 GENERALIZED TONIC CLONIC EPILEPSY (H): ICD-10-CM

## 2023-05-23 RX ORDER — LEVETIRACETAM 1000 MG/1
2000 TABLET ORAL 2 TIMES DAILY
Qty: 120 TABLET | Refills: 11 | Status: SHIPPED | OUTPATIENT
Start: 2023-05-23 | End: 2023-09-07 | Stop reason: DRUGHIGH

## 2023-05-23 ASSESSMENT — PAIN SCALES - GENERAL: PAINLEVEL: NO PAIN (0)

## 2023-05-23 ASSESSMENT — PATIENT HEALTH QUESTIONNAIRE - PHQ9: SUM OF ALL RESPONSES TO PHQ QUESTIONS 1-9: 17
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
## 2023-05-23 NOTE — NURSING NOTE
Patient declined medication review due to doing the e-check in and everything up to date.       Patient scored 17 on PHQ-9      Is the patient currently in the state of [place]? YES    Visit mode:VIDEO    If the visit is dropped, the patient can be reconnected by: VIDEO VISIT: Text to cell phone: [phone]    Will anyone else be joining the visit? NO      How would you like to obtain your AVS? MyChart    Are changes needed to the allergy or medication list? NO    Reason for visit: RECHCHANDLER Lares, VF

## 2023-09-07 ENCOUNTER — OFFICE VISIT (OUTPATIENT)
Dept: NEUROLOGY | Facility: CLINIC | Age: 43
End: 2023-09-07
Payer: COMMERCIAL

## 2023-09-07 VITALS
BODY MASS INDEX: 49.18 KG/M2 | HEART RATE: 72 BPM | SYSTOLIC BLOOD PRESSURE: 123 MMHG | WEIGHT: 293 LBS | TEMPERATURE: 97.2 F | DIASTOLIC BLOOD PRESSURE: 85 MMHG

## 2023-09-07 DIAGNOSIS — G40.309 GENERALIZED EPILEPSY (H): Primary | ICD-10-CM

## 2023-09-07 RX ORDER — ACETAZOLAMIDE 250 MG/1
250 TABLET ORAL PRN
Qty: 10 TABLET | Refills: 3 | Status: SHIPPED | OUTPATIENT
Start: 2023-09-07

## 2023-09-07 RX ORDER — LEVETIRACETAM 500 MG/1
TABLET ORAL
Qty: 900 TABLET | Refills: 3 | Status: SHIPPED | OUTPATIENT
Start: 2023-09-07 | End: 2024-01-19
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
## 2023-09-07 NOTE — PATIENT INSTRUCTIONS
Times of Days  AM  PM       Medication Tablet Size Number of Tablets/Capsules Total Daily Dosage    NOW levetiracetam 500 mg  4    4      4000 mg                      START levetiracetam 500 mg  4    6      5000 mg                                                                                               Increase levetiracetam a little as above.    Get a levetiracetam blood level when you see primary care. Call us after it is done so we cn look it up.    We have started a new prescription. Take acetazolamide 250 mg as soon as you awake on mornings in which you have had little sleep. This will help you with the jerking attacks.    Please follow up with psychiatry. Until anxiety is better addressed, the insomnia will continue and controlling the morning myoclonus (jerking)  with signfiicantly disrupted sleep will be very difficult.    DO NOT drive in the first two hours after awakening. This is when the jerks are most likely to occur in your situation.    Carry this with you at all times.  CONTINUE TAKING YOUR OTHER MEDICATIONS AS PREVIOUSLY DIRECTED.      * * *Do not store medications in the bathroom.  Keep medications away from children!* * *

## 2023-09-27 ENCOUNTER — TELEPHONE (OUTPATIENT)
Dept: NEUROLOGY | Facility: CLINIC | Age: 43
End: 2023-09-27
Payer: COMMERCIAL

## 2023-09-27 NOTE — TELEPHONE ENCOUNTER
Left message to reschedule new patient visit with Dr. Church-from Nov 3rd.  Provider will not be in clinic.  Offer first available with Dr. Church or patient may be scheduled with the other providers within the Headache clinic.

## 2023-10-02 NOTE — TELEPHONE ENCOUNTER
Please confirm if a virtual visit with Lyric on a Monday can be approved. Patient needs to be rescheduled from Ranjit's schedule, scheduling looking for options before the next call back to reschedule

## 2023-10-06 ENCOUNTER — TELEPHONE (OUTPATIENT)
Dept: NEUROLOGY | Facility: CLINIC | Age: 43
End: 2023-10-06
Payer: COMMERCIAL

## 2023-10-09 ENCOUNTER — VIRTUAL VISIT (OUTPATIENT)
Dept: NEUROLOGY | Facility: CLINIC | Age: 43
End: 2023-10-09
Payer: COMMERCIAL

## 2023-10-09 ENCOUNTER — TELEPHONE (OUTPATIENT)
Dept: NEUROLOGY | Facility: CLINIC | Age: 43
End: 2023-10-09

## 2023-10-09 DIAGNOSIS — G43.009 MIGRAINE WITHOUT AURA AND WITHOUT STATUS MIGRAINOSUS, NOT INTRACTABLE: Primary | ICD-10-CM

## 2023-10-09 PROCEDURE — 99214 OFFICE O/P EST MOD 30 MIN: CPT | Mod: 95

## 2023-10-09 RX ORDER — HYDROXYZINE HYDROCHLORIDE 25 MG/1
TABLET, FILM COATED ORAL
COMMUNITY

## 2023-10-09 RX ORDER — CITALOPRAM HYDROBROMIDE 20 MG/1
20 TABLET ORAL EVERY MORNING
COMMUNITY

## 2023-10-09 RX ORDER — DIVALPROEX SODIUM 125 MG/1
TABLET, DELAYED RELEASE ORAL
COMMUNITY
End: 2024-01-11

## 2023-10-09 RX ORDER — DIAZEPAM 5 MG
TABLET ORAL
COMMUNITY
Start: 2023-08-17

## 2023-10-09 RX ORDER — ONDANSETRON 4 MG/1
4 TABLET, FILM COATED ORAL EVERY 8 HOURS PRN
Qty: 20 TABLET | Refills: 3 | Status: SHIPPED | OUTPATIENT
Start: 2023-10-09

## 2023-10-09 RX ORDER — DILTIAZEM HYDROCHLORIDE 120 MG/1
CAPSULE, COATED, EXTENDED RELEASE ORAL
COMMUNITY
Start: 2023-09-13

## 2023-10-09 RX ORDER — SUMATRIPTAN 50 MG/1
50 TABLET, FILM COATED ORAL
COMMUNITY
Start: 2022-08-04

## 2023-10-09 RX ORDER — HYDROCHLOROTHIAZIDE 25 MG/1
25 TABLET ORAL DAILY
COMMUNITY

## 2023-10-09 RX ORDER — PANTOPRAZOLE SODIUM 40 MG/1
1 TABLET, DELAYED RELEASE ORAL DAILY
COMMUNITY
Start: 2023-07-17

## 2023-10-09 RX ORDER — METOPROLOL TARTRATE 25 MG/1
50 TABLET, FILM COATED ORAL 2 TIMES DAILY
COMMUNITY

## 2023-10-09 RX ORDER — CEFPROZIL 500 MG/1
TABLET, FILM COATED ORAL
COMMUNITY
Start: 2022-12-02

## 2023-10-09 RX ORDER — RIZATRIPTAN BENZOATE 5 MG/1
5 TABLET ORAL
Qty: 18 TABLET | Refills: 11 | Status: SHIPPED | OUTPATIENT
Start: 2023-10-09

## 2023-10-09 ASSESSMENT — HEADACHE IMPACT TEST (HIT 6)
WHEN YOU HAVE HEADACHES HOW OFTEN IS THE PAIN SEVERE: ALWAYS
HOW OFTEN HAVE YOU FELT TOO TIRED TO WORK BECAUSE OF YOUR HEADACHES: ALWAYS
HOW OFTEN DO HEADACHES LIMIT YOUR DAILY ACTIVITIES: ALWAYS
WHEN YOU HAVE A HEADACHE HOW OFTEN DO YOU WISH YOU COULD LIE DOWN: ALWAYS
HOW OFTEN DID HEADACHS LIMIT CONCENTRATION ON WORK OR DAILY ACTIVITY: ALWAYS
HOW OFTEN HAVE YOU FELT FED UP OR IRRITATED BECAUSE OF YOUR HEADACHES: ALWAYS
HIT6 TOTAL SCORE: 78

## 2023-10-09 ASSESSMENT — PAIN SCALES - GENERAL: PAINLEVEL: NO PAIN (0)

## 2023-10-09 NOTE — PROGRESS NOTES
Virtual Visit Details    Type of service:  Video Visit   Video Start Time:  9:12 AM  Video End Time: 9:44 AM    Originating Location (pt. Location): Home    Distant Location (provider location):  Off-site  Platform used for Video Visit: Yanelis
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
## 2023-10-09 NOTE — TELEPHONE ENCOUNTER
Please schedule:     Return in about 3 months (around 1/9/2024) for Follow up, with any available provider, using a video visit.
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
## 2023-10-09 NOTE — NURSING NOTE
Pt and VF completed as much of the QNRS as we could before appt time    Pt stated concerns about the dosage of her Keppra medication. Pt would like to talk about possible side effects    Is the patient currently in the state of [place]? YES    Visit mode:VIDEO    If the visit is dropped, the patient can be reconnected by: VIDEO VISIT: Text to cell phone:   Telephone Information:   Mobile [phone]       Will anyone else be joining the visit? NO  (If patient encounters technical issues they should call [phone])    How would you like to obtain your AVS? MyChart    Are changes needed to the allergy or medication list? Pt stated no changes to allergies and Pt stated no med changes    Reason for visit: Consult (New headache )    Shanika Wood VVF    Depression Response    Pt declined to continue to the pHQ9 stating she is already seeing a mental health professional

## 2023-11-22 ENCOUNTER — TELEPHONE (OUTPATIENT)
Dept: NURSING | Facility: CLINIC | Age: 43
End: 2023-11-22
Payer: COMMERCIAL

## 2023-11-22 NOTE — TELEPHONE ENCOUNTER
Patient calling for the number to her epilepsy physician. Found the number in her AVS and transferred patient. No triage.     JOMAR CRISOSTOMO RN

## 2023-12-18 ENCOUNTER — MYC MEDICAL ADVICE (OUTPATIENT)
Dept: NEUROLOGY | Facility: CLINIC | Age: 43
End: 2023-12-18

## 2024-01-08 ENCOUNTER — VIRTUAL VISIT (OUTPATIENT)
Dept: NEUROLOGY | Facility: CLINIC | Age: 44
End: 2024-01-08
Payer: COMMERCIAL

## 2024-01-08 VITALS — HEIGHT: 67 IN | BODY MASS INDEX: 45.99 KG/M2 | WEIGHT: 293 LBS

## 2024-01-08 DIAGNOSIS — G43.009 MIGRAINE WITHOUT AURA AND WITHOUT STATUS MIGRAINOSUS, NOT INTRACTABLE: Primary | ICD-10-CM

## 2024-01-08 PROCEDURE — 99213 OFFICE O/P EST LOW 20 MIN: CPT | Mod: 95

## 2024-01-08 ASSESSMENT — MIGRAINE DISABILITY ASSESSMENT (MIDAS)
HOW MANY DAYS WAS YOUR PRODUCTIVITY CUT IN HALF BECAUSE OF HEADACHES: 0
HOW MANY DAYS DID YOU NOT DO HOUSEWORK BECAUSE OF HEADACHES: 50
TOTAL SCORE: 75
ON A SCALE FROM 0-10 ON AVERAGE HOW PAINFUL WERE HEADACHES: 6
HOW MANY DAYS IN THE PAST 3 MONTHS HAVE YOU HAD A HEADACHE: 15
HOW OFTEN WERE SOCIAL ACTIVITIES MISSED DUE TO HEADACHES: 0
HOW MANY DAYS WAS HOUSEWORK PRODUCTIVITY CUT IN HALF DUE TO HEADACHES: 25
HOW MANY DAYS DID YOU MISS WORK OR SCHOOL BECAUSE OF HEADACHES: 0

## 2024-01-08 ASSESSMENT — HEADACHE IMPACT TEST (HIT 6)
WHEN YOU HAVE HEADACHES HOW OFTEN IS THE PAIN SEVERE: SOMETIMES
WHEN YOU HAVE A HEADACHE HOW OFTEN DO YOU WISH YOU COULD LIE DOWN: ALWAYS
HOW OFTEN HAVE YOU FELT TOO TIRED TO WORK BECAUSE OF YOUR HEADACHES: SOMETIMES
HOW OFTEN HAVE YOU FELT FED UP OR IRRITATED BECAUSE OF YOUR HEADACHES: SOMETIMES
WHEN YOU HAVE A HEADACHE HOW OFTEN DO YOU WISH YOU COULD LIE DOWN: ALWAYS
WHEN YOU HAVE HEADACHES HOW OFTEN IS THE PAIN SEVERE: SOMETIMES
HOW OFTEN HAVE YOU FELT FED UP OR IRRITATED BECAUSE OF YOUR HEADACHES: SOMETIMES
HIT6 TOTAL SCORE: 67
HOW OFTEN DO HEADACHES LIMIT YOUR DAILY ACTIVITIES: VERY OFTEN
HOW OFTEN HAVE YOU FELT TOO TIRED TO WORK BECAUSE OF YOUR HEADACHES: SOMETIMES
HOW OFTEN DID HEADACHS LIMIT CONCENTRATION ON WORK OR DAILY ACTIVITY: ALWAYS
HIT6 TOTAL SCORE: 67
HOW OFTEN DID HEADACHS LIMIT CONCENTRATION ON WORK OR DAILY ACTIVITY: ALWAYS
HOW OFTEN DO HEADACHES LIMIT YOUR DAILY ACTIVITIES: VERY OFTEN

## 2024-01-08 ASSESSMENT — PATIENT HEALTH QUESTIONNAIRE - PHQ9: SUM OF ALL RESPONSES TO PHQ QUESTIONS 1-9: 18

## 2024-01-08 ASSESSMENT — PAIN SCALES - GENERAL: PAINLEVEL: EXTREME PAIN (8)

## 2024-01-08 NOTE — PROGRESS NOTES
Virtual Visit Details    Type of service:  Video Visit   Video Start Time: 11:38 AM  Video End Time:11:45 AM    Originating Location (pt. Location): Home    Distant Location (provider location):  Off-site  Platform used for Video Visit: [facility]    Headache Neurology Progress Note    January 8, 2024    Subjective:    Estelita presents for follow up of frequent episodic vs chronic migraine without aura. Her headaches are complicated by a history of epilepsy and nonepileptic spells, PTSD, anxiety, Luis-Parkinson-White, nephrolithiasis.     I last saw Estelita 10/9/2023 (initial encounter), at which time made recommended a trial of Ajovy for additional headache prophylaxis.    She has done 2 doses of Ajovy so far and has tolerated this well.  She denies any problems with administering this medication.  She does feel as though she is needing to use rizatriptan less frequently since starting this medication.  She currently reports 4-8/30 headaches per month, with 3-4/30 severe headache days per month.  Rizatriptan 5 mg is still effective if needed.  She uses acetaminophen to manage mild headaches.  Ondansetron is helpful for nausea.    She notes over the past month she is having some intermittent spinning sensations 1-2 times per week. She will need to lie down but will feel like things are still spinning. Denies recent illness, tinnitus, hearing loss. This is occurring separate from her headaches.     Has upcoming follow-up with Dr. Parrish on 1/11/2024, needs re-certification for medical cannabis.    Current headache treatments:  Acute therapies:  - Acetaminophen  - Rizatriptan 5 mg  - Ondansetron 4 mg      Preventative therapies:  - Amitriptyline 100 mg - not effective for headache  - Diltiazem (per cardiology)  - Levetiracetam  - Acetazolamide   - Ajovy - effective       Supportive therapies:     Previous treatments tried:  Acute therapies:  - Ibuprofen - GI upset  -  "Sumatriptan 50 mg      Preventative therapies:  - Metoprolol   - Valproic acid - not tolerated  - Topiramate - contraindicated due to nephrolithiasis     Supportive therapies:        Objective:    Vitals: Ht 1.702 m (5' 7\")   Wt 138.8 kg (306 lb)   BMI 47.93 kg/m    General: Cooperative, NAD  Neurologic Exam:  Mental Status: Fully alert, attentive and oriented. Speech is clear and fluent.   Cranial Nerves: Facial movements symmetric.   Motor: No abnormal movements.      Depression Screening Follow-up        1/8/2024    11:17 AM   PHQ   PHQ-9 Total Score 18   Q9: Thoughts of better off dead/self-harm past 2 weeks Not at all       Does the patient currently have a mental health provider?  Yes, patient was referred back to current mental health provider.    RODOLFO HALEY PA-C    Assessment and Plan:   Estelita is a 44 year old female who presents for follow-up of migraine without aura.    For the past month or so, she has been having episodic vertigo though this seems to be independent of her headaches.    We discussed the following treatment strategy:  - For acute treatment of mild headache, she may use acetaminophen as needed, not to exceed 14 days per month to avoid medication overuse.   - For acute treatment of moderate to severe headache, she may use rizatriptan 5 mg taken at onset of headache with repeat dose taken after 2 hours if needed. Use should not exceed 9 days per month to avoid medication overuse.  - For nausea with headache, she may use ondansetron 4 mg as needed.     Her current frequency and severity of headaches warrant prevention.  - Ajovy has been helpful for reducing her headache frequency and severity by about 50%.  I recommend she continue with this.  She tolerates this medication well.  - Alternatively, could consider other CGRP inhibitors.    Follow-up in 6 months or sooner if needed.    Rodolfo Haley PA-C  Headache Neurology  [facility]    "

## 2024-01-08 NOTE — LETTER
1/8/2024         RE: Estelita Patricia  [address]  [place] [facility] [place] [address]        Dear Colleague,    Thank you for referring your patient, Estelita Patricia, to the [facility] NEUROLOGY CLINICS [facility]. Please see a copy of my visit note below.    Virtual Visit Details    Type of service:  Video Visit   Video Start Time: 11:38 AM  Video End Time:11:45 AM    Originating Location (pt. Location): Home    Distant Location (provider location):  Off-site  Platform used for Video Visit: [facility]    Headache Neurology Progress Note    January 8, 2024    Subjective:    Estelita presents for follow up of frequent episodic vs chronic migraine without aura. Her headaches are complicated by a history of epilepsy and nonepileptic spells, PTSD, anxiety, Luis-Parkinson-White, nephrolithiasis.     I last saw Estelita 10/9/2023 (initial encounter), at which time made recommended a trial of Ajovy for additional headache prophylaxis.    She has done 2 doses of Ajovy so far and has tolerated this well.  She denies any problems with administering this medication.  She does feel as though she is needing to use rizatriptan less frequently since starting this medication.  She currently reports 4-8/30 headaches per month, with 3-4/30 severe headache days per month.  Rizatriptan 5 mg is still effective if needed.  She uses acetaminophen to manage mild headaches.  Ondansetron is helpful for nausea.    She notes over the past month she is having some intermittent spinning sensations 1-2 times per week. She will need to lie down but will feel like things are still spinning. Denies recent illness, tinnitus, hearing loss. This is occurring separate from her headaches.     Has upcoming follow-up with Dr. Parrish on 1/11/2024, needs re-certification for medical cannabis.    Current headache treatments:  Acute therapies:  - Acetaminophen  - Rizatriptan 5 mg  - Ondansetron 4 mg     "  Preventative therapies:  - Amitriptyline 100 mg - not effective for headache  - Diltiazem (per cardiology)  - Levetiracetam  - Acetazolamide   - Ajovy - effective       Supportive therapies:     Previous treatments tried:  Acute therapies:  - Ibuprofen - GI upset  - Sumatriptan 50 mg      Preventative therapies:  - Metoprolol   - Valproic acid - not tolerated  - Topiramate - contraindicated due to nephrolithiasis     Supportive therapies:        Objective:    Vitals: Ht 1.702 m (5' 7\")   Wt 138.8 kg (306 lb)   BMI 47.93 kg/m    General: Cooperative, NAD  Neurologic Exam:  Mental Status: Fully alert, attentive and oriented. Speech is clear and fluent.   Cranial Nerves: Facial movements symmetric.   Motor: No abnormal movements.      Depression Screening Follow-up        1/8/2024    11:17 AM   PHQ   PHQ-9 Total Score 18   Q9: Thoughts of better off dead/self-harm past 2 weeks Not at all       Does the patient currently have a mental health provider?  Yes, patient was referred back to current mental health provider.    RODOLFO GARIBAY PA-C    Assessment and Plan:   Estelita is a 44 year old female who presents for follow-up of migraine without aura.    For the past month or so, she has been having episodic vertigo though this seems to be independent of her headaches.    We discussed the following treatment strategy:  - For acute treatment of mild headache, she may use acetaminophen as needed, not to exceed 14 days per month to avoid medication overuse.   - For acute treatment of moderate to severe headache, she may use rizatriptan 5 mg taken at onset of headache with repeat dose taken after 2 hours if needed. Use should not exceed 9 days per month to avoid medication overuse.  - For nausea with headache, she may use ondansetron 4 mg as needed.     Her current frequency and severity of headaches warrant prevention.  - Ajovy has been helpful for reducing her headache frequency and severity by about 50%.  I recommend " she continue with this.  She tolerates this medication well.  - Alternatively, could consider other CGRP inhibitors.    Follow-up in 6 months or sooner if needed.    Rodolfo Haley PA-C  Headache Neurology  [facility]      Again, thank you for allowing me to participate in the care of your patient.        Sincerely,        RODOLFO HALEY PA-C

## 2024-01-08 NOTE — NURSING NOTE
Is the patient currently in the state of [place]? YES    Visit mode:VIDEO    If the visit is dropped, the patient can be reconnected by: VIDEO VISIT: Text to cell phone:   Telephone Information:   Mobile [phone]       Will anyone else be joining the visit? NO  (If patient encounters technical issues they should call [phone])    How would you like to obtain your AVS? MyChart    Are changes needed to the allergy or medication list? No    Reason for visit: RECHECK    Darleen Middleton VVF    Depression Response    Patient completed the PHQ-9 assessment for depression and scored >9? Yes  Question 9 on the PHQ-9 was positive for suicidality? No  Does patient have current mental health provider? Yes    Is this a virtual visit? Yes   Does patient have suicidal ideation (positive question 9)? No - offer to place Mental Health Referral.  Patient declined referral/not needed    I personally notified the following: visit provider

## 2024-01-11 ENCOUNTER — OFFICE VISIT (OUTPATIENT)
Dept: NEUROLOGY | Facility: CLINIC | Age: 44
End: 2024-01-11
Payer: COMMERCIAL

## 2024-01-11 VITALS
TEMPERATURE: 97.3 F | WEIGHT: 293 LBS | OXYGEN SATURATION: 97 % | HEART RATE: 112 BPM | SYSTOLIC BLOOD PRESSURE: 133 MMHG | BODY MASS INDEX: 49.68 KG/M2 | DIASTOLIC BLOOD PRESSURE: 86 MMHG

## 2024-01-11 DIAGNOSIS — G40.309 GENERALIZED EPILEPSY (H): Primary | ICD-10-CM
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
## 2024-01-11 NOTE — PATIENT INSTRUCTIONS
Lab orders for your the blood tests needed are included with this after visit summary. Please present these to your local clinic to get the blood work drawn.    Your local clinic is not in the [employer] system and so will not communicate these results to us. We will often not have access to these results digitally either. To make sure we have access to the results you need to do two things:    1) Call us after the blood work is done.  2) Tell us date blood work was done, location of the clinic, and the telephone number of the clinic.    We can then make the appropriate calls and try to track the results down.      Ask your [facility] to set up a consultation with ENT doctor to evaluate your vertigo.    Use the pill box that we gave you. If you miss doses keep your levetiracetam doses separate by 3 to 4 hours.
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
## 2024-01-19 DIAGNOSIS — G40.309 GENERALIZED EPILEPSY (H): ICD-10-CM

## 2024-01-19 RX ORDER — LEVETIRACETAM 500 MG/1
TABLET ORAL
Qty: 1080 TABLET | Refills: 3 | Status: SHIPPED | OUTPATIENT
Start: 2024-01-19 | End: 2024-01-30

## 2024-01-20 NOTE — PROGRESS NOTES
Patient reports worsening jerking.    Levetiracetam level done in local [facility]. I am unable to see on Care Everywhere. Patient looks up on her mychart and states that the result is 19.5. She states that she is levetiracetam (500) 2000  in AM and 3000 in PM for total of 5000 mg per day.    Asked to increase levetiracetam to 3000 mg twice a day. Rx written.    During call we obtained email [email or web address]. We registered patient for [place] medical cannabis program as she requested.    She has follow up scheduled. Asked to call if spells continue worsened. May need further VEEG to determine whether these are epileptic or not, given that she has had NEE demonstrated during some jerks in the past.    Blaise Parrish MD

## 2024-01-30 ENCOUNTER — MYC MEDICAL ADVICE (OUTPATIENT)
Dept: NEUROLOGY | Facility: CLINIC | Age: 44
End: 2024-01-30

## 2024-01-30 ENCOUNTER — TELEPHONE (OUTPATIENT)
Dept: NEUROLOGY | Facility: CLINIC | Age: 44
End: 2024-01-30

## 2024-01-30 DIAGNOSIS — G40.309 GENERALIZED EPILEPSY (H): ICD-10-CM

## 2024-01-30 RX ORDER — LEVETIRACETAM 500 MG/1
TABLET ORAL
Qty: 900 TABLET | Refills: 3 | Status: SHIPPED | OUTPATIENT
Start: 2024-01-30 | End: 2024-02-26

## 2024-01-30 NOTE — TELEPHONE ENCOUNTER
"Per Dr. Parrish - \"Level was 19 on 5000 so we increased to 6000. Since she is having side effects should reduce back to 2000 in AM and 3000 in PM. We should get another levetiracetam level.\"    Order modified to reflect these changes.   Will reply to patient via my chart to explain changes per Dr. Parrish. Will encourage patient to get labs.   "

## 2024-01-30 NOTE — TELEPHONE ENCOUNTER
Eprescribed medication(s) amlodipine to pharmacy per protocol, with supporting DX:hypertension.  Refilled per refill protocol.     Estelita is calling because her Keppra dosage was increased to 6000mgs and she is stating that this is to much, she stated that even her pharmacy is stating that this dosage is to high. She is having twitching and anger issues and worse depression.

## 2024-02-10 ENCOUNTER — HOSPITAL ENCOUNTER (EMERGENCY)
Facility: CLINIC | Age: 44
Discharge: LEFT BEFORE DISCHARGE | End: 2024-02-10
Payer: COMMERCIAL

## 2024-02-10 VITALS
BODY MASS INDEX: 45.99 KG/M2 | TEMPERATURE: 97.7 F | HEIGHT: 67 IN | RESPIRATION RATE: 18 BRPM | HEART RATE: 116 BPM | DIASTOLIC BLOOD PRESSURE: 103 MMHG | OXYGEN SATURATION: 98 % | SYSTOLIC BLOOD PRESSURE: 154 MMHG | WEIGHT: 293 LBS

## 2024-02-12 ENCOUNTER — VIRTUAL VISIT (OUTPATIENT)
Dept: NEUROLOGY | Facility: CLINIC | Age: 44
End: 2024-02-12
Payer: COMMERCIAL

## 2024-02-12 VITALS — BODY MASS INDEX: 45.99 KG/M2 | HEIGHT: 67 IN | WEIGHT: 293 LBS

## 2024-02-12 DIAGNOSIS — G40.309 GENERALIZED EPILEPSY (H): Primary | ICD-10-CM

## 2024-02-12 RX ORDER — LACOSAMIDE 100 MG/1
TABLET ORAL
Qty: 124 TABLET | Refills: 5 | Status: SHIPPED | OUTPATIENT
Start: 2024-02-12

## 2024-02-12 ASSESSMENT — PATIENT HEALTH QUESTIONNAIRE - PHQ9
SUM OF ALL RESPONSES TO PHQ QUESTIONS 1-9: 12
SUM OF ALL RESPONSES TO PHQ QUESTIONS 1-9: 12
10. IF YOU CHECKED OFF ANY PROBLEMS, HOW DIFFICULT HAVE THESE PROBLEMS MADE IT FOR YOU TO DO YOUR WORK, TAKE CARE OF THINGS AT HOME, OR GET ALONG WITH OTHER PEOPLE: SOMEWHAT DIFFICULT

## 2024-02-12 ASSESSMENT — PAIN SCALES - GENERAL: PAINLEVEL: NO PAIN (0)

## 2024-02-12 NOTE — NURSING NOTE
"Patient declined allergy and medication review with VF. Said unsure of all the medications, just puts them in the pill box and takes them. Stated they were reviewed on 1/16/24 when had visit with PCP.      Patient scored 12 on PHQ-9.      Depression Response    Patient completed the PHQ-9 assessment for depression and scored >9? Yes  Question 9 on the PHQ-9 was positive for suicidality? No  Does patient have current mental health provider? Yes    Is this a virtual visit? Yes   Does patient have suicidal ideation (positive question 9)? No. Pt declined referral due to already having a Mental Health Provider.     I personally notified the following: visit provider Put PHQ-9 score in MSG Column for provider awareness.           Is the patient currently in the state of [place]? YES    Visit mode:VIDEO    If the visit is dropped, the patient can be reconnected by: VIDEO VISIT: Text to cell phone:   Telephone Information:   Mobile [phone]       Will anyone else be joining the visit? YES: How would they like to receive their invitation? Text to cell phone: [phone]  patient stated a friend with but declined to give a name.   (If patient encounters technical issues they should call [phone] :[phone])    How would you like to obtain your AVS? MyChart    Are changes needed to the allergy or medication list? Patient declined allergy and medication review with VF. Said unsure of all the medications, just puts them in the pill box and takes them. Stated they were reviewed on 1/16/24 when had visit with PCP.    Reason for visit: RECHECK (Patient said, \" Reoccurring seizures, patient said went to [facility] on Saturday 2/10/24 to go get Lab work, but didn't want to sit for 4 hours to get them done, had blood drawn on 1/16/24 with PCP.\" )      Darleen Lares VVF     "
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
## 2024-02-12 NOTE — PATIENT INSTRUCTIONS
Times of Days  AM  PM       Medication Tablet Size Number of Tablets/Capsules Total Daily Dosage    levetiracetam  500 mg  4    4      4000 mg                      day 1 to day 5 lacosamide 100 mg  0    1      100 mg    day 6 to day 10 lacosamide 100 mg  1    1      200 mg    day 11 to day 15 lacosamide 100 mg  1    2      300 mg    day 20 and after lacosamide 100 mg  2    2      400 mg                                         Reduce levetiracetam as above.    Start and slowly increase lacosamide as above.    We will have you follow up with one of our physician assistants in the next 6 weeks.  Carry this with you at all times.  CONTINUE TAKING YOUR OTHER MEDICATIONS AS PREVIOUSLY DIRECTED.      * * *Do not store medications in the bathroom.  Keep medications away from children!* * *

## 2024-02-18 ENCOUNTER — HEALTH MAINTENANCE LETTER (OUTPATIENT)
Age: 44
End: 2024-02-18

## 2024-02-20 ENCOUNTER — MYC MEDICAL ADVICE (OUTPATIENT)
Dept: NEUROLOGY | Facility: CLINIC | Age: 44
End: 2024-02-20

## 2024-02-20 NOTE — TELEPHONE ENCOUNTER
Patient is calling stating that since medication change she has not been feeling like herself. Patient states that she has been feeling disoriented, all she does is sleep and has not been feeling like herself. Patient would also like to discuss an inpatient EEG, instead of an ambulatory as she is not able to accommodate multiple days of travel. Patient can be reached at [phone].

## 2024-02-23 NOTE — TELEPHONE ENCOUNTER
[facility] Call Center    Phone Message    May a detailed message be left on voicemail: yes     Reason for Call: Other: pt is calling back to speak to provider or care team regarding her message to . please call back at [phone]      Action Taken: Other: mincep    Travel Screening: Not Applicable

## 2024-02-27 ENCOUNTER — TELEPHONE (OUTPATIENT)
Dept: NEUROLOGY | Facility: CLINIC | Age: 44
End: 2024-02-27

## 2024-02-27 NOTE — TELEPHONE ENCOUNTER
Received [employer] [facility] - Medical Health Questions Form to be completed. Form saved to R drive, encounter routed.  Snehal Reyes, CMA

## 2024-03-06 ENCOUNTER — TELEPHONE (OUTPATIENT)
Dept: NEUROLOGY | Facility: CLINIC | Age: 44
End: 2024-03-06

## 2024-03-06 NOTE — TELEPHONE ENCOUNTER
Caller reporting the following red-flag symptom(s): Pt called and stated that starting on 3/5/24 she started to experience twitching in her arms and legs. Pt also stated that she has been falling frequently as well as she has been dropping items.  Writer attempted to connect Pt with red flag triage nurse. Writer was on hold for 5 minutes with no success in reaching a red flag triage nurse.  Writer advise the Pt if it was an emergency to hang up and call 911.    Please contact Pt to further advise at [phone]    Per the system red-flag symptom policy, patient was instructed to:  speak with a Registered Nurse    Action:  Message sent to the clinic

## 2024-03-28 ENCOUNTER — TELEPHONE (OUTPATIENT)
Dept: NEUROLOGY | Facility: CLINIC | Age: 44
End: 2024-03-28

## 2024-03-28 NOTE — TELEPHONE ENCOUNTER
Reached out to patient to schedule follow up visit per check out notes dated 02/12/2024. Patient states that she is in the process of transferring her care and has declined scheduling at this time.

## 2024-05-13 ENCOUNTER — TELEPHONE (OUTPATIENT)
Dept: NEUROLOGY | Facility: CLINIC | Age: 44
End: 2024-05-13

## 2024-05-13 NOTE — TELEPHONE ENCOUNTER
Prior Authorization Specialty Medication Request    Medication/Dose: Fremanezumab-vfrm (AJOVY) 225 MG/1.5ML SOAJ  Diagnosis and ICD code (if different than what is on RX):    New/renewal/insurance change PA/secondary ins. PA:  Previously Tried and Failed:      Important Lab Values:   Rationale:     Insurance   Primary:   Insurance ID:      Secondary (if applicable):  Insurance ID:      Pharmacy Information (if different than what is on RX)  Name:    Phone:    Fax:

## 2024-05-25 NOTE — TELEPHONE ENCOUNTER
Retail Pharmacy Prior Authorization Team   Phone: [phone]    PA Initiation    Medication: AJOVY 225 MG/1.5ML SC SOAJ  Insurance Company: [employer] - Phone [phone] Fax [phone]  Pharmacy Filling the Rx: [facility] [place] [address]  Filling Pharmacy Phone: [phone]  Filling Pharmacy Fax:    Start Date: 5/25/2024

## 2024-05-28 NOTE — TELEPHONE ENCOUNTER
Prior Authorization Approval    Medication: AJOVY 225 MG/1.5ML SC SOAJ  Authorization Effective Date: 2/25/2024  Authorization Expiration Date: 11/25/2024  Approved Dose/Quantity:   Reference #:     Insurance Company: [employer] - Phone [phone] Fax [phone]  Expected CoPay: $    CoPay Card Available:      Financial Assistance Needed:   Which Pharmacy is filling the prescription: [facility] - [place], [place] - [address]   Pharmacy Notified: Yes  Patient Notified: **Instructed pharmacy to notify patient when script is ready to /ship.**

## 2024-07-07 ENCOUNTER — HEALTH MAINTENANCE LETTER (OUTPATIENT)
Age: 44
End: 2024-07-07

## 2024-08-16 DIAGNOSIS — G40.309 GENERALIZED EPILEPSY (H): ICD-10-CM

## 2024-08-19 NOTE — TELEPHONE ENCOUNTER
Medication Requested:  Lacosamide (VIMPAT) 100 MG TABS tablet 124 tablet 5 2/12/2024 -- No   Sig: Increase as instructed until taking two tablets twice daily (400 mg per day)     ----------------------  Last Office Visit : 2/12/2024   [facility]      Future Office visit:  0  ----------------------    Refill decision: Refill pended and routed to the provider for review/determination due to the following criteria not met:     CONTROLLED MEDICATION

## 2024-08-21 ENCOUNTER — TELEPHONE (OUTPATIENT)
Dept: NEUROLOGY | Facility: CLINIC | Age: 44
End: 2024-08-21

## 2024-08-21 RX ORDER — LACOSAMIDE 100 MG/1
TABLET ORAL
Qty: 120 TABLET | Refills: 5 | OUTPATIENT
Start: 2024-08-21

## 2024-08-21 NOTE — TELEPHONE ENCOUNTER
Received request for lacosamide refill.    Chart notes indicate patient has moved to another medical system.    Called patient today and she confirmed that she had moved to another medical system and that physicians from another medical system would be filling her AED Rx.    Therefore did not fill Rx request.    Blaise Parrish MD

## 2024-08-27 ENCOUNTER — TELEPHONE (OUTPATIENT)
Dept: INFECTIOUS DISEASES | Facility: CLINIC | Age: 44
End: 2024-08-27
Payer: COMMERCIAL

## 2024-08-27 NOTE — TELEPHONE ENCOUNTER
OKSANA Chavez informing pt is seen at [facility]. Provider number to contact [facility] ID.

## 2024-08-27 NOTE — TELEPHONE ENCOUNTER
[facility] Call Center    Phone Message    May a detailed message be left on voicemail: yes     Reason for Call: Other: .     Kathy states patient is no longer wanting services. Kathy states patient is no longer wants home care services, and states patient does not want them to facilitate her dressing changes or lab draws. Kathy states patient is wanting to go to an outpatient clinic near her, Phone: [phone]. Kathy states they are not able to give that outpatient orders and coordinate that with them. Kathy states patient is refusing to have a visit with them today and have instructed patient to reach out to Dr. Ham clinic or go to urgent care for dressing change that needs done and ask to coordinate to speak with Dr. Ham office since the provider ordered IV therapy. Kathy is wanting to get a call back. Please advise.         Action Taken: Message routed to:  [facility] ID    Travel Screening: Not Applicable     Date of Service:

## 2024-10-08 DIAGNOSIS — G43.009 MIGRAINE WITHOUT AURA AND WITHOUT STATUS MIGRAINOSUS, NOT INTRACTABLE: ICD-10-CM

## 2024-10-08 DIAGNOSIS — G40.309 GENERALIZED EPILEPSY (H): ICD-10-CM

## 2024-10-09 RX ORDER — LACOSAMIDE 100 MG/1
TABLET ORAL
Qty: 120 TABLET | Refills: 5 | OUTPATIENT
Start: 2024-10-09

## 2024-10-09 RX ORDER — FREMANEZUMAB-VFRM 225 MG/1.5ML
225 INJECTION SUBCUTANEOUS
Qty: 1.5 ML | Refills: 11 | Status: SHIPPED | OUTPATIENT
Start: 2024-10-09 | End: 2024-10-28

## 2024-10-09 NOTE — TELEPHONE ENCOUNTER
Called pt and scheduled VV 10/28/24.     Routing refill request to provider for review/approval because:  Drug not on the [facility] refill protocol   Pt is needing refill prior to visit.     Nidia RN, BSN  [facility]

## 2024-10-17 ENCOUNTER — TELEPHONE (OUTPATIENT)
Dept: NEUROLOGY | Facility: CLINIC | Age: 44
End: 2024-10-17

## 2024-10-17 NOTE — TELEPHONE ENCOUNTER
Prior Authorization Retail Medication Request    Medication/Dose: AJOVY 225 MG/1.5ML SOAJ  Diagnosis and ICD code (if different than what is on RX):    New/renewal/insurance change PA/secondary ins. PA:  Previously Tried and Failed:    Rationale:      Insurance   Primary:   Insurance ID:      Secondary (if applicable):  Insurance ID:      Pharmacy Information (if different than what is on RX)  Name:    Phone:    Fax:    Clinic Information  Preferred routing pool for dept communication: JO GREWAL([place]isi)      KEY:[id number]      Primary Visit Coverage    Payer Plan Sponsor Code Group Number Group Name   BLUE PLUS BLUE PLUS ADVANTAGE [place] [id number]      Primary Visit Coverage Subscriber    ID Name N Address   [id number] DAVIAN LÓPEZ [id number] [place] [id number]

## 2024-10-21 NOTE — TELEPHONE ENCOUNTER
Central Prior Authorization Team   Phone: [phone]    PA Initiation    Medication: AJOVY 225 MG/1.5ML SOAJ  Insurance Company: [place] Medicaid ([facility]) - Phone [phone] Fax [phone]  Pharmacy Filling the Rx: [facility] - [address]  Filling Pharmacy Phone: [phone]  Filling Pharmacy Fax:    Start Date: 10/21/2024

## 2024-10-22 NOTE — TELEPHONE ENCOUNTER
PRIOR AUTHORIZATION DENIED    Medication: AJOVY 225 MG/1.5ML SOAJ-PA DENIED     Denial Date: 10/21/2024    Denial Rational:             Appeal Information:

## 2024-10-27 ASSESSMENT — HEADACHE IMPACT TEST (HIT 6)
WHEN YOU HAVE A HEADACHE HOW OFTEN DO YOU WISH YOU COULD LIE DOWN: VERY OFTEN
HOW OFTEN DO HEADACHES LIMIT YOUR DAILY ACTIVITIES: VERY OFTEN
HOW OFTEN HAVE YOU FELT FED UP OR IRRITATED BECAUSE OF YOUR HEADACHES: VERY OFTEN
WHEN YOU HAVE HEADACHES HOW OFTEN IS THE PAIN SEVERE: VERY OFTEN
HOW OFTEN HAVE YOU FELT TOO TIRED TO WORK BECAUSE OF YOUR HEADACHES: VERY OFTEN
HIT6 TOTAL SCORE: 66
HOW OFTEN DID HEADACHS LIMIT CONCENTRATION ON WORK OR DAILY ACTIVITY: VERY OFTEN

## 2024-10-27 ASSESSMENT — MIGRAINE DISABILITY ASSESSMENT (MIDAS)
ON A SCALE FROM 0-10 ON AVERAGE HOW PAINFUL WERE HEADACHES: 10
HOW MANY DAYS IN THE PAST 3 MONTHS HAVE YOU HAD A HEADACHE: 1
TOTAL SCORE: 5
HOW MANY DAYS WAS YOUR PRODUCTIVITY CUT IN HALF BECAUSE OF HEADACHES: 1
HOW MANY DAYS WAS HOUSEWORK PRODUCTIVITY CUT IN HALF DUE TO HEADACHES: 1
HOW MANY DAYS DID YOU MISS WORK OR SCHOOL BECAUSE OF HEADACHES: 1
HOW OFTEN WERE SOCIAL ACTIVITIES MISSED DUE TO HEADACHES: 1
HOW MANY DAYS DID YOU NOT DO HOUSEWORK BECAUSE OF HEADACHES: 1

## 2024-10-28 ENCOUNTER — VIRTUAL VISIT (OUTPATIENT)
Dept: NEUROLOGY | Facility: CLINIC | Age: 44
End: 2024-10-28
Payer: COMMERCIAL

## 2024-10-28 DIAGNOSIS — G43.009 MIGRAINE WITHOUT AURA AND WITHOUT STATUS MIGRAINOSUS, NOT INTRACTABLE: Primary | ICD-10-CM

## 2024-10-28 PROCEDURE — G2211 COMPLEX E/M VISIT ADD ON: HCPCS | Mod: 95

## 2024-10-28 PROCEDURE — 99214 OFFICE O/P EST MOD 30 MIN: CPT | Mod: 95

## 2024-10-28 RX ORDER — MECLIZINE HYDROCHLORIDE 25 MG/1
TABLET ORAL
COMMUNITY
Start: 2024-01-16

## 2024-10-28 RX ORDER — AMITRIPTYLINE HYDROCHLORIDE 100 MG/1
100 TABLET ORAL AT BEDTIME
COMMUNITY
Start: 2024-10-15

## 2024-10-28 RX ORDER — ROPINIROLE 0.5 MG/1
TABLET, FILM COATED ORAL
COMMUNITY
Start: 2024-03-28

## 2024-10-28 RX ORDER — ONDANSETRON 4 MG/1
4 TABLET, ORALLY DISINTEGRATING ORAL EVERY 8 HOURS PRN
Qty: 20 TABLET | Refills: 3 | Status: SHIPPED | OUTPATIENT
Start: 2024-10-28

## 2024-10-28 RX ORDER — ESOMEPRAZOLE MAGNESIUM 40 MG/1
CAPSULE, DELAYED RELEASE ORAL
COMMUNITY

## 2024-10-28 RX ORDER — FREMANEZUMAB-VFRM 225 MG/1.5ML
225 INJECTION SUBCUTANEOUS
Qty: 1.5 ML | Refills: 11 | Status: SHIPPED | OUTPATIENT
Start: 2024-10-28

## 2024-10-28 RX ORDER — RIZATRIPTAN BENZOATE 5 MG/1
5 TABLET ORAL
Qty: 18 TABLET | Refills: 11 | Status: SHIPPED | OUTPATIENT
Start: 2024-10-28

## 2024-10-28 RX ORDER — PSEUDOEPHEDRINE HCL 30 MG
100 TABLET ORAL
COMMUNITY
Start: 2024-09-17

## 2024-10-28 RX ORDER — HYDROCORTISONE 25 MG/G
CREAM TOPICAL
COMMUNITY
Start: 2024-09-18

## 2024-10-28 ASSESSMENT — PATIENT HEALTH QUESTIONNAIRE - PHQ9: SUM OF ALL RESPONSES TO PHQ QUESTIONS 1-9: 16

## 2024-10-28 NOTE — NURSING NOTE
Current patient location: [address] [place] [address] [address]  [facility] [address]    Is the patient currently in the state of [place]? YES    Visit mode:VIDEO    If the visit is dropped, the patient can be reconnected by: TELEPHONE VISIT: Phone number:   Telephone Information:   Mobile [phone]       Will anyone else be joining the visit? NO  (If patient encounters technical issues they should call [phone]    Are changes needed to the allergy or medication list? Pt stated no med changes    Are refills needed on medications prescribed by this physician? NO    Rooming Documentation:  Questionnaire(s) completed    Reason for visit: RAYSA HARDINF

## 2024-10-28 NOTE — PROGRESS NOTES
Virtual Visit Details    Type of service:  Video Visit   Video Start Time: 11:39 AM  Video End Time:11:47 AM    Originating Location (pt. Location): Home    Distant Location (provider location):  Off-site  Platform used for Video Visit: [facility]    Headache Neurology Progress Note    October 28, 2024    Assessment and Plan:   Estelita is a 44 year old female who presents for follow up of frequent episodic migraine.     We discussed the following treatment strategy:  - For acute treatment of mild headache, she may use acetaminophen as needed, not to exceed 14 days per month to avoid medication overuse.   - For acute treatment of moderate to severe headache, she may use rizatriptan 5 mg taken at onset of headache with repeat dose taken after 2 hours if needed. Use should not exceed 9 days per month to avoid medication overuse.  - For nausea with headache, she may use ondansetron 4 mg ODT as needed.      Her current frequency and severity of headaches warrant prevention.  - Ajovy has been helpful for reducing her headache frequency and severity by at least 50%.  I recommend she continue with this.  She tolerates this medication well and does not experience any adverse side effects or significant injection site reaction. Will appeal most recent denial.  - I attest that she will not be prescribed Botox for additional headache therapy while being treated with a CGRP inhibitor. Combined therapy of Botox with a CGRP inhibitor is not covered by her insurance.   - Alternatively, could consider other CGRP inhibitors.      The longitudinal plan of care for the diagnosis(es)/condition(s) as documented were addressed during this visit. Due to the added complexity in care, I will continue to support Estelita in the subsequent management and with ongoing continuity of care.     Follow up in 6 months.     Lyric Haley PA-C  Headache Neurology  [facility] Neurology -  "[place]      Subjective:    Estelita presents for follow up of migraine without aura.   Her headaches are complicated by a history of epilepsy and nonepileptic spells, PTSD, anxiety, Luis-Parkinson-White, nephrolithiasis. Recently, she was hospitalized and treated for osteomyelitis of the skull, thought to be due to either a dental or sinus infection.     Headaches have been worse recently due to skull osteomyelitis. Additionally, insurance denied Ajovy so she has been without this for 2 months.     With Ajovy, her migraines were better managed and \"more calm\". She will only have 1-2 migraines per month and could effectively treat these with rizatriptan. Ondansetron is helpful if needed for nausea.     Ajovy has been well tolerated and she is eager to restart this.     At her baseline, she reported 8-12/30 headache days per month with 4-8/30 severe (migraine) days per month.     Headaches are behind the left eye or on the right side of her head. Headaches are sharp, stabbing, throbbing and can last 1-2 days in duration. Headaches can reach a 10/10 in intensity.  She has associated nausea with photophobia, phonophobia.   She can have blurred vision or diplopia with headaches.   She can have numbness or tingling in her right hand when headache is at its worst.   Her left eye may water with headaches.   Sometimes she may feel dizzy or lightheaded with headache.      Current headache treatments:  Acute therapies:  - Acetaminophen  - Rizatriptan 5 mg  - Ondansetron 4 mg ODT     Preventative therapies:  - Amitriptyline 100 mg - not effective for headache  - Diltiazem (per cardiology)  - Levetiracetam  - Acetazolamide   - Ajovy - effective     Supportive therapies:     Previous treatments tried:  Acute therapies:  - Ibuprofen - GI upset  - Sumatriptan 50 mg      Preventative therapies:  - Metoprolol   - Valproic acid - not tolerated  - Topiramate - contraindicated due to nephrolithiasis     Supportive therapies:           " 10/9/2023     8:52 AM 1/8/2024    11:23 AM 10/27/2024     6:59 PM   HIT-6   When you have headaches, how often is the pain severe 13  10  11    How often do headaches limit your ability to do usual daily activities including household work, work, school, or social activities? 13  11  11    When you have a headache, how often do you wish you could lie down? 13  13  11    In the past 4 weeks, how often have you felt too tired to do work or daily activities because of your headaches 13  10  11    In the past 4 weeks, how often have you felt fed up or irritated because of your headaches 13  10  11    In the past 4 weeks, how often did headaches limit your ability to concentrate on work or daily activities 13  13  11    HIT-6 Total Score 78 67 66        Patient-reported           10/9/2023     8:59 AM 1/8/2024    11:25 AM 10/27/2024     7:00 PM   MIDAS - in the past three months:   On how many days did you miss work or school because of your headaches? 20 0 1   How many days was your productivity at work or school reduced by half or more because of your headaches? 30 0 1   On how many days did you not do household work because of your headaches? 20 50 1   How many days was your productivity in household work reduced by half or more because of your headaches? 30 25 1   On how many days did you miss family, social, or leisure activities because of your headaches? 30 0 1   On how many days did you have a headache? 30 15 1   On a scale of 0-10, on average how painful were these headaches? 10 6 10   MIDAS Score 130 (IV - Severe Disability) 75 (IV - Severe Disability) 5 (I - Little or No Disability)        Objective:    Vitals: There were no vitals taken for this visit.  General: Cooperative, NAD  Neurologic Exam:  Mental Status: Fully alert, attentive and oriented. Speech is clear and fluent.   Cranial Nerves: Facial movements symmetric.   Motor: No abnormal movements.

## 2024-10-28 NOTE — LETTER
10/28/2024      Estelita Patricia  [address]  [place] [address]      Dear Colleague,    Thank you for referring your patient, Estelita Patricia, to the [facility]UROLOG[facility]. Please see a copy of my visit note below.    Virtual Visit Details    Type of service:  Video Visit   Video Start Time: 11:39 AM  Video End Time:11:47 AM    Originating Location (pt. Location): Home    Distant Location (provider location):  Off-site  Platform used for Video Visit: [facility]    Headache Neurology Progress Note    October 28, 2024    Assessment and Plan:   Estelita is a 44 year old female who presents for follow up of frequent episodic migraine.     We discussed the following treatment strategy:  - For acute treatment of mild headache, she may use acetaminophen as needed, not to exceed 14 days per month to avoid medication overuse.   - For acute treatment of moderate to severe headache, she may use rizatriptan 5 mg taken at onset of headache with repeat dose taken after 2 hours if needed. Use should not exceed 9 days per month to avoid medication overuse.  - For nausea with headache, she may use ondansetron 4 mg ODT as needed.      Her current frequency and severity of headaches warrant prevention.  - Ajovy has been helpful for reducing her headache frequency and severity by about 50%.  I recommend she continue with this.  She tolerates this medication well. Will appeal most recent denial.  - Alternatively, could consider other CGRP inhibitors.      The longitudinal plan of care for the diagnosis(es)/condition(s) as documented were addressed during this visit. Due to the added complexity in care, I will continue to support Estelita in the subsequent management and with ongoing continuity of care.     Follow up in 6 months.     Lyric Haley PA-C  Headache Neurology  [facility]      Subjective:    Estelita presents for follow up of  "migraine without aura.   Her headaches are complicated by a history of epilepsy and nonepileptic spells, PTSD, anxiety, Luis-Parkinson-White, nephrolithiasis. Recently, she was hospitalized and treated for osteomyelitis of the skull, thought to be due to either a dental or sinus infection.     Headaches have been worse recently due to skull osteomyelitis. Additionally, insurance denied Ajovy so she has been without this for 2 months.     With Ajovy, her migraines were better managed and \"more calm\". She will only have 1-2 migraines per month and could effectively treat these with rizatriptan. Ondansetron is helpful if needed for nausea.     Ajovy has been well tolerated and she is eager to restart this.       Current headache treatments:  Acute therapies:  - Acetaminophen  - Rizatriptan 5 mg  - Ondansetron 4 mg ODT     Preventative therapies:  - Amitriptyline 100 mg - not effective for headache  - Diltiazem (per cardiology)  - Levetiracetam  - Acetazolamide   - Ajovy - effective     Supportive therapies:     Previous treatments tried:  Acute therapies:  - Ibuprofen - GI upset  - Sumatriptan 50 mg      Preventative therapies:  - Metoprolol   - Valproic acid - not tolerated  - Topiramate - contraindicated due to nephrolithiasis     Supportive therapies:           10/9/2023     8:52 AM 1/8/2024    11:23 AM 10/27/2024     6:59 PM   HIT-6   When you have headaches, how often is the pain severe 13  10  11    How often do headaches limit your ability to do usual daily activities including household work, work, school, or social activities? 13  11  11    When you have a headache, how often do you wish you could lie down? 13  13  11    In the past 4 weeks, how often have you felt too tired to do work or daily activities because of your headaches 13  10  11    In the past 4 weeks, how often have you felt fed up or irritated because of your headaches 13  10  11    In the past 4 weeks, how often did headaches limit your " ability to concentrate on work or daily activities 13  13  11    HIT-6 Total Score 78 67 66        Patient-reported           10/9/2023     8:59 AM 1/8/2024    11:25 AM 10/27/2024     7:00 PM   MIDAS - in the past three months:   On how many days did you miss work or school because of your headaches? 20 0 1   How many days was your productivity at work or school reduced by half or more because of your headaches? 30 0 1   On how many days did you not do household work because of your headaches? 20 50 1   How many days was your productivity in household work reduced by half or more because of your headaches? 30 25 1   On how many days did you miss family, social, or leisure activities because of your headaches? 30 0 1   On how many days did you have a headache? 30 15 1   On a scale of 0-10, on average how painful were these headaches? 10 6 10   MIDAS Score 130 (IV - Severe Disability) 75 (IV - Severe Disability) 5 (I - Little or No Disability)        Objective:    Vitals: There were no vitals taken for this visit.  General: Cooperative, NAD  Neurologic Exam:  Mental Status: Fully alert, attentive and oriented. Speech is clear and fluent.   Cranial Nerves: Facial movements symmetric.   Motor: No abnormal movements.            Again, thank you for allowing me to participate in the care of your patient.        Sincerely,        RODOLFO GARIBAY PA-C

## 2024-10-30 NOTE — TELEPHONE ENCOUNTER
Medication Appeal Initiation    We have initiated an appeal for the requested medication:  Medication: AJOVY 225 MG/1.5ML SOAJ-PA DENIED, INITIATE PA APPEAL   Appeal Start Date:  10/30/2024  Insurance Company:  [employer]  (FAX) [phone]  Comments:           Initiate PA Appeal via fax [phone] with Letter of Medical Necessity (dated 10/30/2024) attached with Request. Awaiting on a response on Appeal.

## 2024-10-31 NOTE — TELEPHONE ENCOUNTER
MEDICATION APPEAL APPROVED    Medication: AJOVY 225 MG/1.5ML SOAJ-PA DENIED, PA APPEAL APPROVED   Authorization Effective Date:  10/1/2024  Authorization Expiration Date:  11/30/2024  Approved Dose/Quantity:   Reference #: PA # [id number]   Insurance Company:    Expected CoPay:       CoPay Card Available:      Foundation Assistance Needed:    Which Pharmacy is filling the prescription (Not needed for infusion/clinic administered): [facility] [place] [address]    Be Advised PA Appeal Approved Until 11/30/2024 as a Bridge of Service. Patient will begin PrePaid Health Plan (PPHP) on 12/1/2024

## 2024-12-23 ENCOUNTER — TELEPHONE (OUTPATIENT)
Dept: NEUROLOGY | Facility: CLINIC | Age: 44
End: 2024-12-23
Payer: COMMERCIAL

## 2024-12-23 NOTE — TELEPHONE ENCOUNTER
Retail Pharmacy Prior Authorization Team   Phone: [phone]    [facility] - [id number]

## 2024-12-24 NOTE — TELEPHONE ENCOUNTER
Retail Pharmacy Prior Authorization Team   Phone: [phone]      CMM KEY: (Key: [id number])    PA Initiation    Medication: AJOVY 225 MG/1.5ML SC SOAJ  Insurance Company: [facility] [employer] - Phone [phone] Fax [phone] - [employer].  Pharmacy Filling the Rx:    Filling Pharmacy Phone:    Filling Pharmacy Fax:    Start Date: 12/24/2024

## 2024-12-26 NOTE — TELEPHONE ENCOUNTER
Retail Pharmacy Prior Authorization Team   Phone: [phone]      Prior Authorization Approval    Medication: AJOVY 225 MG/1.5ML SC SOAJ  Authorization Effective Date: 12/24/2024  Authorization Expiration Date: 6/23/2025  Approved Dose/Quantity:   Reference #:     Insurance Company: [employer] - Phone [phone] Fax [phone]  Expected CoPay: $    CoPay Card Available: No    Financial Assistance Needed:   Which Pharmacy is filling the prescription: [facility] - [address]  Pharmacy Notified: Yes - Pharmacy will need to order in the product, but it should be filled tomorrow.  Patient Notified: **Instructed pharmacy to notify patient when script is ready to /ship.**

## 2025-07-19 ENCOUNTER — HEALTH MAINTENANCE LETTER (OUTPATIENT)
Age: 45
End: 2025-07-19